# Patient Record
Sex: FEMALE | Race: WHITE | NOT HISPANIC OR LATINO | Employment: FULL TIME | URBAN - METROPOLITAN AREA
[De-identification: names, ages, dates, MRNs, and addresses within clinical notes are randomized per-mention and may not be internally consistent; named-entity substitution may affect disease eponyms.]

---

## 2019-01-25 ENCOUNTER — HOSPITAL ENCOUNTER (OUTPATIENT)
Facility: MEDICAL CENTER | Age: 29
End: 2019-01-25
Attending: OBSTETRICS & GYNECOLOGY
Payer: COMMERCIAL

## 2019-01-25 ENCOUNTER — HOSPITAL ENCOUNTER (OUTPATIENT)
Dept: LAB | Facility: MEDICAL CENTER | Age: 29
End: 2019-01-25
Attending: OBSTETRICS & GYNECOLOGY
Payer: COMMERCIAL

## 2019-01-25 LAB
APPEARANCE UR: CLEAR
COLOR UR: YELLOW
GLUCOSE UR STRIP.AUTO-MCNC: NEGATIVE MG/DL
KETONES UR STRIP.AUTO-MCNC: NEGATIVE MG/DL
LEUKOCYTE ESTERASE UR QL STRIP.AUTO: NEGATIVE
MICRO URNS: NORMAL
NITRITE UR QL STRIP.AUTO: NEGATIVE
PH UR STRIP.AUTO: 6 [PH]
PROT UR QL STRIP: NEGATIVE MG/DL
RBC UR QL AUTO: NEGATIVE
SP GR UR STRIP.AUTO: 1.03

## 2019-01-25 PROCEDURE — 81003 URINALYSIS AUTO W/O SCOPE: CPT

## 2019-01-25 PROCEDURE — 87086 URINE CULTURE/COLONY COUNT: CPT

## 2019-01-26 ENCOUNTER — HOSPITAL ENCOUNTER (OUTPATIENT)
Dept: LAB | Facility: MEDICAL CENTER | Age: 29
End: 2019-01-26
Attending: OBSTETRICS & GYNECOLOGY
Payer: COMMERCIAL

## 2019-01-26 LAB
ANION GAP SERPL CALC-SCNC: 9 MMOL/L (ref 0–11.9)
B-HCG SERPL-ACNC: <0.6 MIU/ML (ref 0–5)
BASOPHILS # BLD AUTO: 0.4 % (ref 0–1.8)
BASOPHILS # BLD: 0.04 K/UL (ref 0–0.12)
CHLORIDE SERPL-SCNC: 109 MMOL/L (ref 96–112)
CO2 SERPL-SCNC: 23 MMOL/L (ref 20–33)
EOSINOPHIL # BLD AUTO: 0.4 K/UL (ref 0–0.51)
EOSINOPHIL NFR BLD: 4.1 % (ref 0–6.9)
ERYTHROCYTE [DISTWIDTH] IN BLOOD BY AUTOMATED COUNT: 42.4 FL (ref 35.9–50)
HCT VFR BLD AUTO: 42.3 % (ref 37–47)
HGB BLD-MCNC: 13.3 G/DL (ref 12–16)
IMM GRANULOCYTES # BLD AUTO: 0.03 K/UL (ref 0–0.11)
IMM GRANULOCYTES NFR BLD AUTO: 0.3 % (ref 0–0.9)
LYMPHOCYTES # BLD AUTO: 2.37 K/UL (ref 1–4.8)
LYMPHOCYTES NFR BLD: 24.1 % (ref 22–41)
MCH RBC QN AUTO: 25.6 PG (ref 27–33)
MCHC RBC AUTO-ENTMCNC: 31.4 G/DL (ref 33.6–35)
MCV RBC AUTO: 81.3 FL (ref 81.4–97.8)
MONOCYTES # BLD AUTO: 0.52 K/UL (ref 0–0.85)
MONOCYTES NFR BLD AUTO: 5.3 % (ref 0–13.4)
NEUTROPHILS # BLD AUTO: 6.46 K/UL (ref 2–7.15)
NEUTROPHILS NFR BLD: 65.8 % (ref 44–72)
NRBC # BLD AUTO: 0 K/UL
NRBC BLD-RTO: 0 /100 WBC
PLATELET # BLD AUTO: 255 K/UL (ref 164–446)
PMV BLD AUTO: 12.6 FL (ref 9–12.9)
POTASSIUM SERPL-SCNC: 3.5 MMOL/L (ref 3.6–5.5)
RBC # BLD AUTO: 5.2 M/UL (ref 4.2–5.4)
SODIUM SERPL-SCNC: 141 MMOL/L (ref 135–145)
WBC # BLD AUTO: 9.8 K/UL (ref 4.8–10.8)

## 2019-01-26 PROCEDURE — 85025 COMPLETE CBC W/AUTO DIFF WBC: CPT

## 2019-01-26 PROCEDURE — 36415 COLL VENOUS BLD VENIPUNCTURE: CPT

## 2019-01-26 PROCEDURE — 80051 ELECTROLYTE PANEL: CPT

## 2019-01-26 PROCEDURE — 84702 CHORIONIC GONADOTROPIN TEST: CPT

## 2019-01-28 LAB
BACTERIA UR CULT: NORMAL
SIGNIFICANT IND 70042: NORMAL
SITE SITE: NORMAL
SOURCE SOURCE: NORMAL

## 2019-04-04 ENCOUNTER — HOSPITAL ENCOUNTER (OUTPATIENT)
Dept: LAB | Facility: MEDICAL CENTER | Age: 29
End: 2019-04-04
Attending: OBSTETRICS & GYNECOLOGY
Payer: COMMERCIAL

## 2019-04-04 PROCEDURE — 87077 CULTURE AEROBIC IDENTIFY: CPT

## 2019-04-04 PROCEDURE — 87070 CULTURE OTHR SPECIMN AEROBIC: CPT

## 2019-04-04 PROCEDURE — 87186 SC STD MICRODIL/AGAR DIL: CPT

## 2019-04-07 LAB
BACTERIA WND AEROBE CULT: ABNORMAL
BACTERIA WND AEROBE CULT: ABNORMAL
SIGNIFICANT IND 70042: ABNORMAL
SITE SITE: ABNORMAL
SOURCE SOURCE: ABNORMAL

## 2019-07-31 ENCOUNTER — HOSPITAL ENCOUNTER (OUTPATIENT)
Dept: RADIOLOGY | Facility: MEDICAL CENTER | Age: 29
End: 2019-07-31
Attending: OBSTETRICS & GYNECOLOGY
Payer: COMMERCIAL

## 2019-07-31 DIAGNOSIS — T83.32XA MALPOSITIONED INTRAUTERINE DEVICE (IUD), INITIAL ENCOUNTER: ICD-10-CM

## 2019-07-31 PROCEDURE — 76830 TRANSVAGINAL US NON-OB: CPT

## 2019-11-07 ENCOUNTER — APPOINTMENT (OUTPATIENT)
Dept: RADIOLOGY | Facility: MEDICAL CENTER | Age: 29
End: 2019-11-07
Attending: EMERGENCY MEDICINE
Payer: COMMERCIAL

## 2019-11-07 ENCOUNTER — HOSPITAL ENCOUNTER (EMERGENCY)
Facility: MEDICAL CENTER | Age: 29
End: 2019-11-07
Attending: EMERGENCY MEDICINE
Payer: COMMERCIAL

## 2019-11-07 VITALS
OXYGEN SATURATION: 95 % | TEMPERATURE: 97.2 F | DIASTOLIC BLOOD PRESSURE: 74 MMHG | SYSTOLIC BLOOD PRESSURE: 136 MMHG | RESPIRATION RATE: 18 BRPM | HEART RATE: 93 BPM

## 2019-11-07 DIAGNOSIS — J40 BRONCHITIS: ICD-10-CM

## 2019-11-07 DIAGNOSIS — R06.2 WHEEZING: ICD-10-CM

## 2019-11-07 PROCEDURE — 94640 AIRWAY INHALATION TREATMENT: CPT

## 2019-11-07 PROCEDURE — 94760 N-INVAS EAR/PLS OXIMETRY 1: CPT

## 2019-11-07 PROCEDURE — 700101 HCHG RX REV CODE 250: Performed by: EMERGENCY MEDICINE

## 2019-11-07 PROCEDURE — 700111 HCHG RX REV CODE 636 W/ 250 OVERRIDE (IP): Performed by: EMERGENCY MEDICINE

## 2019-11-07 PROCEDURE — 71046 X-RAY EXAM CHEST 2 VIEWS: CPT

## 2019-11-07 PROCEDURE — 99284 EMERGENCY DEPT VISIT MOD MDM: CPT

## 2019-11-07 RX ORDER — ALBUTEROL SULFATE 90 UG/1
2 AEROSOL, METERED RESPIRATORY (INHALATION) EVERY 6 HOURS PRN
Qty: 8.5 G | Refills: 0 | Status: SHIPPED | OUTPATIENT
Start: 2019-11-07

## 2019-11-07 RX ORDER — AZITHROMYCIN 250 MG/1
TABLET, FILM COATED ORAL
Qty: 6 TAB | Refills: 0 | Status: ON HOLD | OUTPATIENT
Start: 2019-11-07 | End: 2020-03-03

## 2019-11-07 RX ORDER — TRAZODONE HYDROCHLORIDE 50 MG/1
50 TABLET ORAL NIGHTLY
COMMUNITY

## 2019-11-07 RX ORDER — METHYLPREDNISOLONE 4 MG/1
TABLET ORAL
Qty: 1 PACKAGE | Refills: 0 | Status: ON HOLD | OUTPATIENT
Start: 2019-11-07 | End: 2020-03-03

## 2019-11-07 RX ORDER — IPRATROPIUM BROMIDE AND ALBUTEROL SULFATE 2.5; .5 MG/3ML; MG/3ML
3 SOLUTION RESPIRATORY (INHALATION)
Status: COMPLETED | OUTPATIENT
Start: 2019-11-07 | End: 2019-11-07

## 2019-11-07 RX ORDER — ALPRAZOLAM 0.5 MG/1
0.5 TABLET ORAL 3 TIMES DAILY PRN
Status: SHIPPED | COMMUNITY
Start: 2018-09-25 | End: 2024-02-06

## 2019-11-07 RX ADMIN — IPRATROPIUM BROMIDE AND ALBUTEROL SULFATE 3 ML: .5; 3 SOLUTION RESPIRATORY (INHALATION) at 09:21

## 2019-11-07 RX ADMIN — PREDNISONE 60 MG: 10 TABLET ORAL at 09:19

## 2019-11-07 ASSESSMENT — PAIN SCALES - WONG BAKER: WONGBAKER_NUMERICALRESPONSE: DOESN'T HURT AT ALL

## 2019-11-07 ASSESSMENT — LIFESTYLE VARIABLES: DO YOU DRINK ALCOHOL: NO

## 2019-11-07 NOTE — DISCHARGE INSTRUCTIONS
Take the medication as directed.  Any worsening difficulty breathing, new or different symptoms or concerns return.

## 2019-11-07 NOTE — ED PROVIDER NOTES
"ED Provider Note    CHIEF COMPLAINT  Chief Complaint   Patient presents with   • Congestion   • Cough       HPI  Kelli Doherty is a 29 y.o. female who presents to the emergency department with persistent cough.  Patient has been coughing for over a week.  Patient has a history of asthma in the past but has not used inhalers in several years.  Patient states at times the cough is so bad it actually makes her vomit.  She states she is had brown-green mucus but no blood in her mucus.  She denies any leg swelling.  Patient does complain of a little bit of burning in her chest with cough only.  No exertional chest pain.  No leg swelling or calf pain.  No history of blood clots or cardiac problems.  No diarrhea.  She is had chills but no fevers.  Over-the-counter medications have not helped her symptoms.    REVIEW OF SYSTEMS  Positive for cough and nasal congestion, productive sputum, chills, Negative for chest pain, dizziness, syncope, leg swelling, hyperglycemia, headache, sore throat, diarrhea.    PAST MEDICAL HISTORY   Asthma    SOCIAL HISTORY  Social History     Tobacco Use   • Smoking status: Current Every Day Smoker     Packs/day: 0.50     Types: Cigarettes   Substance and Sexual Activity   • Alcohol use: No   • Drug use: No   • Sexual activity: Not on file       SURGICAL HISTORY   has a past surgical history that includes gyn surgery.    CURRENT MEDICATIONS  Reviewed.  See Encounter Summary.      ALLERGIES  Allergies   Allergen Reactions   • Aleve [Naproxen Sodium] Rash     \"rash\"   • Betadine [Povidone Iodine] Rash     \"rash, hives\"   • Sulfamethoxazole W-Trimethoprim Vomiting     \"vomiting\"       PHYSICAL EXAM  VITAL SIGNS: /92   Pulse 92   Temp 36.2 °C (97.2 °F) (Temporal)   Resp 18   SpO2 97%   Constitutional:  Alert in no apparent distress.  Coughing  HENT: Normocephalic, Bilateral external ears normal.  Nasal congestion  Eyes: Pupils are equal and reactive. Conjunctiva normal, non-icteric. "   Thorax & Lungs: Coarse breath sounds with few scattered wheezes, no rhonchi or rales, no retractions or respiratory distress  Cardiovascular: Regular rate and rhythm  Abdomen:  No gross signs of peritonitis, no pain with movement   Skin: Visualized skin is  Dry, No erythema, No rash.   Extremities:   No edema, No asymmetry, no calf tenderness  Neurologic: Alert, Grossly non-focal.   Psychiatric: Affect and Mood normal      Radiology  DX-CHEST-2 VIEWS   Final Result      No evidence of acute cardiopulmonary process.            COURSE & MEDICAL DECISION MAKING  Nursing notes and vital signs were reviewed. (See chart for details)    The patient presents to the Emergency Department with cough and nasal congestion.  Patient has remote history of asthma.  Denies smoking history.  Occasionally coughs so much she vomits.  Coughing here in the ER does not sound like a staccato cough like pertussis.  Patient claims of productive sputum.  No hemoptysis.  Patient is not hypoxic here in any acute respiratory distress.  She does have a few scattered wheezes on exam.  Plan is to obtain an x-ray to rule out pneumonia versus bronchitis versus URI.  Also try a neb treatment.  Patient does not have a symptoms that suggest cardiac etiology.  Patient also has no leg swelling or calf pain and no history to suggest PE.    Chest x-ray has returned and shows no evidence of pneumonia.  Plan at this time is to treat the patient for a possible asthmatic bronchitis.  She will be treated with an albuterol inhaler, steroids and antibiotics.  Strict return precautions were given.  Patient understands plan.         The patient verbally agreed to the discharge precautions and follow-up plan which is documented in EPIC.    FINAL IMPRESSION  1. Bronchitis     2. Wheezing

## 2019-11-07 NOTE — ED TRIAGE NOTES
Pt amb to triage c/o cough and congestion >1wk; progressively worsening. Pt wearing mask in triage.

## 2019-11-07 NOTE — ED NOTES
Pt alert and oriented, endorses ease of breathing. Patient verbalized understanding of dc instructions and follow up. Patient ambulatory without assistance.

## 2020-03-02 ENCOUNTER — HOSPITAL ENCOUNTER (INPATIENT)
Facility: MEDICAL CENTER | Age: 30
LOS: 2 days | DRG: 392 | End: 2020-03-04
Attending: EMERGENCY MEDICINE | Admitting: HOSPITALIST
Payer: COMMERCIAL

## 2020-03-02 ENCOUNTER — APPOINTMENT (OUTPATIENT)
Dept: RADIOLOGY | Facility: MEDICAL CENTER | Age: 30
DRG: 392 | End: 2020-03-02
Attending: EMERGENCY MEDICINE
Payer: COMMERCIAL

## 2020-03-02 DIAGNOSIS — R10.9 ABDOMINAL PAIN, UNSPECIFIED ABDOMINAL LOCATION: ICD-10-CM

## 2020-03-02 DIAGNOSIS — R11.0 NAUSEA: ICD-10-CM

## 2020-03-02 DIAGNOSIS — K52.9 COLITIS: ICD-10-CM

## 2020-03-02 PROBLEM — J45.909 ASTHMA: Status: ACTIVE | Noted: 2020-03-02

## 2020-03-02 PROBLEM — F41.9 ANXIETY DISORDER: Status: ACTIVE | Noted: 2020-03-02

## 2020-03-02 PROBLEM — E87.6 HYPOKALEMIA: Status: ACTIVE | Noted: 2020-03-02

## 2020-03-02 LAB
ALBUMIN SERPL BCP-MCNC: 4.3 G/DL (ref 3.2–4.9)
ALBUMIN/GLOB SERPL: 1.4 G/DL
ALP SERPL-CCNC: 69 U/L (ref 30–99)
ALT SERPL-CCNC: 13 U/L (ref 2–50)
ANION GAP SERPL CALC-SCNC: 15 MMOL/L (ref 7–16)
APPEARANCE UR: CLEAR
AST SERPL-CCNC: 12 U/L (ref 12–45)
BASOPHILS # BLD AUTO: 0.4 % (ref 0–1.8)
BASOPHILS # BLD: 0.05 K/UL (ref 0–0.12)
BILIRUB SERPL-MCNC: 0.3 MG/DL (ref 0.1–1.5)
BILIRUB UR QL STRIP.AUTO: NEGATIVE
BUN SERPL-MCNC: 9 MG/DL (ref 8–22)
CALCIUM SERPL-MCNC: 9.2 MG/DL (ref 8.4–10.2)
CHLORIDE SERPL-SCNC: 106 MMOL/L (ref 96–112)
CO2 SERPL-SCNC: 24 MMOL/L (ref 20–33)
COLOR UR: YELLOW
CREAT SERPL-MCNC: 0.71 MG/DL (ref 0.5–1.4)
EOSINOPHIL # BLD AUTO: 0.1 K/UL (ref 0–0.51)
EOSINOPHIL NFR BLD: 0.8 % (ref 0–6.9)
ERYTHROCYTE [DISTWIDTH] IN BLOOD BY AUTOMATED COUNT: 40 FL (ref 35.9–50)
GLOBULIN SER CALC-MCNC: 3 G/DL (ref 1.9–3.5)
GLUCOSE SERPL-MCNC: 101 MG/DL (ref 65–99)
GLUCOSE UR STRIP.AUTO-MCNC: NEGATIVE MG/DL
HCG SERPL QL: NEGATIVE
HCT VFR BLD AUTO: 43.9 % (ref 37–47)
HGB BLD-MCNC: 14.1 G/DL (ref 12–16)
IMM GRANULOCYTES # BLD AUTO: 0.09 K/UL (ref 0–0.11)
IMM GRANULOCYTES NFR BLD AUTO: 0.7 % (ref 0–0.9)
KETONES UR STRIP.AUTO-MCNC: NEGATIVE MG/DL
LEUKOCYTE ESTERASE UR QL STRIP.AUTO: NEGATIVE
LIPASE SERPL-CCNC: 13 U/L (ref 7–58)
LYMPHOCYTES # BLD AUTO: 3.91 K/UL (ref 1–4.8)
LYMPHOCYTES NFR BLD: 30.1 % (ref 22–41)
MCH RBC QN AUTO: 25.8 PG (ref 27–33)
MCHC RBC AUTO-ENTMCNC: 32.1 G/DL (ref 33.6–35)
MCV RBC AUTO: 80.4 FL (ref 81.4–97.8)
MICRO URNS: NORMAL
MONOCYTES # BLD AUTO: 0.59 K/UL (ref 0–0.85)
MONOCYTES NFR BLD AUTO: 4.5 % (ref 0–13.4)
NEUTROPHILS # BLD AUTO: 8.26 K/UL (ref 2–7.15)
NEUTROPHILS NFR BLD: 63.5 % (ref 44–72)
NITRITE UR QL STRIP.AUTO: NEGATIVE
NRBC # BLD AUTO: 0 K/UL
NRBC BLD-RTO: 0 /100 WBC
PH UR STRIP.AUTO: 5.5 [PH] (ref 5–8)
PLATELET # BLD AUTO: 309 K/UL (ref 164–446)
PMV BLD AUTO: 11.4 FL (ref 9–12.9)
POTASSIUM SERPL-SCNC: 3 MMOL/L (ref 3.6–5.5)
PROT SERPL-MCNC: 7.3 G/DL (ref 6–8.2)
PROT UR QL STRIP: NEGATIVE MG/DL
RBC # BLD AUTO: 5.46 M/UL (ref 4.2–5.4)
RBC UR QL AUTO: NEGATIVE
SODIUM SERPL-SCNC: 145 MMOL/L (ref 135–145)
SP GR UR REFRACTOMETRY: 1.03
WBC # BLD AUTO: 13 K/UL (ref 4.8–10.8)

## 2020-03-02 PROCEDURE — 96374 THER/PROPH/DIAG INJ IV PUSH: CPT

## 2020-03-02 PROCEDURE — 87493 C DIFF AMPLIFIED PROBE: CPT

## 2020-03-02 PROCEDURE — 84703 CHORIONIC GONADOTROPIN ASSAY: CPT

## 2020-03-02 PROCEDURE — 83630 LACTOFERRIN FECAL (QUAL): CPT

## 2020-03-02 PROCEDURE — 87040 BLOOD CULTURE FOR BACTERIA: CPT | Mod: 91

## 2020-03-02 PROCEDURE — 700117 HCHG RX CONTRAST REV CODE 255: Performed by: EMERGENCY MEDICINE

## 2020-03-02 PROCEDURE — 700101 HCHG RX REV CODE 250: Performed by: EMERGENCY MEDICINE

## 2020-03-02 PROCEDURE — 700105 HCHG RX REV CODE 258: Performed by: HOSPITALIST

## 2020-03-02 PROCEDURE — 83690 ASSAY OF LIPASE: CPT

## 2020-03-02 PROCEDURE — 700111 HCHG RX REV CODE 636 W/ 250 OVERRIDE (IP): Performed by: EMERGENCY MEDICINE

## 2020-03-02 PROCEDURE — 99285 EMERGENCY DEPT VISIT HI MDM: CPT

## 2020-03-02 PROCEDURE — 81003 URINALYSIS AUTO W/O SCOPE: CPT

## 2020-03-02 PROCEDURE — 96375 TX/PRO/DX INJ NEW DRUG ADDON: CPT

## 2020-03-02 PROCEDURE — 99223 1ST HOSP IP/OBS HIGH 75: CPT | Performed by: HOSPITALIST

## 2020-03-02 PROCEDURE — 700102 HCHG RX REV CODE 250 W/ 637 OVERRIDE(OP): Performed by: EMERGENCY MEDICINE

## 2020-03-02 PROCEDURE — 74177 CT ABD & PELVIS W/CONTRAST: CPT

## 2020-03-02 PROCEDURE — 700111 HCHG RX REV CODE 636 W/ 250 OVERRIDE (IP): Performed by: HOSPITALIST

## 2020-03-02 PROCEDURE — 700102 HCHG RX REV CODE 250 W/ 637 OVERRIDE(OP): Performed by: HOSPITALIST

## 2020-03-02 PROCEDURE — 85025 COMPLETE CBC W/AUTO DIFF WBC: CPT

## 2020-03-02 PROCEDURE — 36415 COLL VENOUS BLD VENIPUNCTURE: CPT

## 2020-03-02 PROCEDURE — 770006 HCHG ROOM/CARE - MED/SURG/GYN SEMI*

## 2020-03-02 PROCEDURE — A9270 NON-COVERED ITEM OR SERVICE: HCPCS | Performed by: HOSPITALIST

## 2020-03-02 PROCEDURE — 80053 COMPREHEN METABOLIC PANEL: CPT

## 2020-03-02 PROCEDURE — 96376 TX/PRO/DX INJ SAME DRUG ADON: CPT

## 2020-03-02 PROCEDURE — A9270 NON-COVERED ITEM OR SERVICE: HCPCS | Performed by: EMERGENCY MEDICINE

## 2020-03-02 RX ORDER — MORPHINE SULFATE 4 MG/ML
4 INJECTION, SOLUTION INTRAMUSCULAR; INTRAVENOUS ONCE
Status: COMPLETED | OUTPATIENT
Start: 2020-03-02 | End: 2020-03-02

## 2020-03-02 RX ORDER — ALBUTEROL SULFATE 90 UG/1
2 AEROSOL, METERED RESPIRATORY (INHALATION) EVERY 6 HOURS PRN
Status: DISCONTINUED | OUTPATIENT
Start: 2020-03-02 | End: 2020-03-04 | Stop reason: HOSPADM

## 2020-03-02 RX ORDER — OXYCODONE HYDROCHLORIDE 5 MG/1
2.5 TABLET ORAL
Status: DISCONTINUED | OUTPATIENT
Start: 2020-03-02 | End: 2020-03-04 | Stop reason: HOSPADM

## 2020-03-02 RX ORDER — SUCRALFATE ORAL 1 G/10ML
1 SUSPENSION ORAL EVERY 6 HOURS
Status: DISCONTINUED | OUTPATIENT
Start: 2020-03-03 | End: 2020-03-04 | Stop reason: HOSPADM

## 2020-03-02 RX ORDER — PROMETHAZINE HYDROCHLORIDE 25 MG/1
12.5-25 SUPPOSITORY RECTAL EVERY 4 HOURS PRN
Status: DISCONTINUED | OUTPATIENT
Start: 2020-03-02 | End: 2020-03-04 | Stop reason: HOSPADM

## 2020-03-02 RX ORDER — MORPHINE SULFATE 4 MG/ML
4 INJECTION, SOLUTION INTRAMUSCULAR; INTRAVENOUS ONCE
Status: ACTIVE | OUTPATIENT
Start: 2020-03-02 | End: 2020-03-03

## 2020-03-02 RX ORDER — PROCHLORPERAZINE EDISYLATE 5 MG/ML
5-10 INJECTION INTRAMUSCULAR; INTRAVENOUS EVERY 4 HOURS PRN
Status: DISCONTINUED | OUTPATIENT
Start: 2020-03-02 | End: 2020-03-04 | Stop reason: HOSPADM

## 2020-03-02 RX ORDER — ALPRAZOLAM 0.5 MG/1
0.5 TABLET ORAL NIGHTLY PRN
Status: DISCONTINUED | OUTPATIENT
Start: 2020-03-02 | End: 2020-03-04 | Stop reason: HOSPADM

## 2020-03-02 RX ORDER — ONDANSETRON 2 MG/ML
4 INJECTION INTRAMUSCULAR; INTRAVENOUS ONCE
Status: COMPLETED | OUTPATIENT
Start: 2020-03-02 | End: 2020-03-02

## 2020-03-02 RX ORDER — HYDROMORPHONE HYDROCHLORIDE 1 MG/ML
0.5 INJECTION, SOLUTION INTRAMUSCULAR; INTRAVENOUS; SUBCUTANEOUS ONCE
Status: ACTIVE | OUTPATIENT
Start: 2020-03-02 | End: 2020-03-03

## 2020-03-02 RX ORDER — OXYCODONE HYDROCHLORIDE 5 MG/1
5 TABLET ORAL
Status: DISCONTINUED | OUTPATIENT
Start: 2020-03-02 | End: 2020-03-04 | Stop reason: HOSPADM

## 2020-03-02 RX ORDER — ONDANSETRON 4 MG/1
4 TABLET, ORALLY DISINTEGRATING ORAL EVERY 4 HOURS PRN
Status: DISCONTINUED | OUTPATIENT
Start: 2020-03-02 | End: 2020-03-04 | Stop reason: HOSPADM

## 2020-03-02 RX ORDER — TRAZODONE HYDROCHLORIDE 50 MG/1
50 TABLET ORAL NIGHTLY
Status: DISCONTINUED | OUTPATIENT
Start: 2020-03-02 | End: 2020-03-04 | Stop reason: HOSPADM

## 2020-03-02 RX ORDER — PROMETHAZINE HYDROCHLORIDE 25 MG/1
12.5-25 TABLET ORAL EVERY 4 HOURS PRN
Status: DISCONTINUED | OUTPATIENT
Start: 2020-03-02 | End: 2020-03-04 | Stop reason: HOSPADM

## 2020-03-02 RX ORDER — ONDANSETRON 2 MG/ML
4 INJECTION INTRAMUSCULAR; INTRAVENOUS EVERY 4 HOURS PRN
Status: DISCONTINUED | OUTPATIENT
Start: 2020-03-02 | End: 2020-03-04 | Stop reason: HOSPADM

## 2020-03-02 RX ORDER — POTASSIUM CHLORIDE 20 MEQ/1
40 TABLET, EXTENDED RELEASE ORAL ONCE
Status: COMPLETED | OUTPATIENT
Start: 2020-03-02 | End: 2020-03-02

## 2020-03-02 RX ORDER — SODIUM CHLORIDE, SODIUM LACTATE, POTASSIUM CHLORIDE, CALCIUM CHLORIDE 600; 310; 30; 20 MG/100ML; MG/100ML; MG/100ML; MG/100ML
INJECTION, SOLUTION INTRAVENOUS CONTINUOUS
Status: DISCONTINUED | OUTPATIENT
Start: 2020-03-02 | End: 2020-03-04 | Stop reason: HOSPADM

## 2020-03-02 RX ORDER — CEFDINIR 300 MG/1
300 CAPSULE ORAL EVERY 12 HOURS
Status: DISCONTINUED | OUTPATIENT
Start: 2020-03-02 | End: 2020-03-03

## 2020-03-02 RX ORDER — ACETAMINOPHEN 325 MG/1
650 TABLET ORAL EVERY 6 HOURS PRN
Status: DISCONTINUED | OUTPATIENT
Start: 2020-03-02 | End: 2020-03-04 | Stop reason: HOSPADM

## 2020-03-02 RX ORDER — HYDROMORPHONE HYDROCHLORIDE 1 MG/ML
0.25 INJECTION, SOLUTION INTRAMUSCULAR; INTRAVENOUS; SUBCUTANEOUS
Status: DISCONTINUED | OUTPATIENT
Start: 2020-03-02 | End: 2020-03-04 | Stop reason: HOSPADM

## 2020-03-02 RX ADMIN — CEFDINIR 300 MG: 300 CAPSULE ORAL at 20:48

## 2020-03-02 RX ADMIN — OXYCODONE HYDROCHLORIDE 5 MG: 5 TABLET ORAL at 23:09

## 2020-03-02 RX ADMIN — HYDROMORPHONE HYDROCHLORIDE 0.25 MG: 1 INJECTION, SOLUTION INTRAMUSCULAR; INTRAVENOUS; SUBCUTANEOUS at 23:57

## 2020-03-02 RX ADMIN — PROCHLORPERAZINE EDISYLATE 10 MG: 5 INJECTION INTRAMUSCULAR; INTRAVENOUS at 23:48

## 2020-03-02 RX ADMIN — METRONIDAZOLE 500 MG: 500 INJECTION, SOLUTION INTRAVENOUS at 20:48

## 2020-03-02 RX ADMIN — SUCRALFATE 1 G: 1 SUSPENSION ORAL at 23:10

## 2020-03-02 RX ADMIN — IOHEXOL 100 ML: 350 INJECTION, SOLUTION INTRAVENOUS at 19:54

## 2020-03-02 RX ADMIN — POTASSIUM CHLORIDE 40 MEQ: 1500 TABLET, EXTENDED RELEASE ORAL at 23:09

## 2020-03-02 RX ADMIN — FENTANYL CITRATE 50 MCG: 50 INJECTION INTRAMUSCULAR; INTRAVENOUS at 19:39

## 2020-03-02 RX ADMIN — MORPHINE SULFATE 4 MG: 4 INJECTION INTRAVENOUS at 20:42

## 2020-03-02 RX ADMIN — TRAZODONE HYDROCHLORIDE 50 MG: 50 TABLET ORAL at 23:09

## 2020-03-02 RX ADMIN — LIDOCAINE HYDROCHLORIDE 30 ML: 20 SOLUTION OROPHARYNGEAL at 18:39

## 2020-03-02 RX ADMIN — SODIUM CHLORIDE, POTASSIUM CHLORIDE, SODIUM LACTATE AND CALCIUM CHLORIDE: 600; 310; 30; 20 INJECTION, SOLUTION INTRAVENOUS at 23:43

## 2020-03-02 RX ADMIN — CEFTRIAXONE SODIUM 2 G: 2 INJECTION, POWDER, FOR SOLUTION INTRAMUSCULAR; INTRAVENOUS at 23:10

## 2020-03-02 RX ADMIN — ONDANSETRON 4 MG: 2 INJECTION INTRAMUSCULAR; INTRAVENOUS at 20:42

## 2020-03-02 RX ADMIN — ONDANSETRON 4 MG: 2 INJECTION INTRAMUSCULAR; INTRAVENOUS at 18:43

## 2020-03-02 ASSESSMENT — LIFESTYLE VARIABLES
EVER HAD A DRINK FIRST THING IN THE MORNING TO STEADY YOUR NERVES TO GET RID OF A HANGOVER: NO
HAVE PEOPLE ANNOYED YOU BY CRITICIZING YOUR DRINKING: NO
CONSUMPTION TOTAL: NEGATIVE
TOTAL SCORE: 0
ON A TYPICAL DAY WHEN YOU DRINK ALCOHOL HOW MANY DRINKS DO YOU HAVE: 1
EVER FELT BAD OR GUILTY ABOUT YOUR DRINKING: NO
ALCOHOL_USE: NO
AVERAGE NUMBER OF DAYS PER WEEK YOU HAVE A DRINK CONTAINING ALCOHOL: 2
TOTAL SCORE: 0
EVER_SMOKED: YES
HAVE YOU EVER FELT YOU SHOULD CUT DOWN ON YOUR DRINKING: NO
HOW MANY TIMES IN THE PAST YEAR HAVE YOU HAD 5 OR MORE DRINKS IN A DAY: 0
TOTAL SCORE: 0

## 2020-03-02 ASSESSMENT — ENCOUNTER SYMPTOMS
SENSORY CHANGE: 0
CONSTIPATION: 0
HEADACHES: 0
TREMORS: 0
NERVOUS/ANXIOUS: 0
DOUBLE VISION: 0
WEAKNESS: 0
DEPRESSION: 0
HEARTBURN: 0
MEMORY LOSS: 0
DIZZINESS: 0
NECK PAIN: 0
SHORTNESS OF BREATH: 0
CHILLS: 0
SPUTUM PRODUCTION: 0
SPEECH CHANGE: 0
EYE PAIN: 0
MYALGIAS: 0
PALPITATIONS: 0
CLAUDICATION: 0
PND: 0
ABDOMINAL PAIN: 1
STRIDOR: 0
BACK PAIN: 0
VOMITING: 0
FEVER: 0
BLOOD IN STOOL: 0
COUGH: 0
DIARRHEA: 0
PHOTOPHOBIA: 0
TINGLING: 0
HEMOPTYSIS: 0
ORTHOPNEA: 0
NAUSEA: 0
SORE THROAT: 0
VOMITING: 1
NAUSEA: 1
BLURRED VISION: 0

## 2020-03-02 ASSESSMENT — COGNITIVE AND FUNCTIONAL STATUS - GENERAL
DAILY ACTIVITIY SCORE: 24
MOBILITY SCORE: 24
SUGGESTED CMS G CODE MODIFIER DAILY ACTIVITY: CH
SUGGESTED CMS G CODE MODIFIER MOBILITY: CH

## 2020-03-02 ASSESSMENT — FIBROSIS 4 INDEX: FIB4 SCORE: 0.33

## 2020-03-02 ASSESSMENT — PATIENT HEALTH QUESTIONNAIRE - PHQ9
1. LITTLE INTEREST OR PLEASURE IN DOING THINGS: NOT AT ALL
2. FEELING DOWN, DEPRESSED, IRRITABLE, OR HOPELESS: NOT AT ALL
SUM OF ALL RESPONSES TO PHQ9 QUESTIONS 1 AND 2: 0

## 2020-03-03 LAB
ALBUMIN SERPL BCP-MCNC: 3.4 G/DL (ref 3.2–4.9)
ALBUMIN/GLOB SERPL: 1.4 G/DL
ALP SERPL-CCNC: 81 U/L (ref 30–99)
ALT SERPL-CCNC: 70 U/L (ref 2–50)
ANION GAP SERPL CALC-SCNC: 11 MMOL/L (ref 7–16)
AST SERPL-CCNC: 125 U/L (ref 12–45)
BASOPHILS # BLD AUTO: 0.2 % (ref 0–1.8)
BASOPHILS # BLD: 0.02 K/UL (ref 0–0.12)
BILIRUB SERPL-MCNC: 0.4 MG/DL (ref 0.1–1.5)
BUN SERPL-MCNC: 8 MG/DL (ref 8–22)
C DIFF DNA SPEC QL NAA+PROBE: NEGATIVE
C DIFF TOX GENS STL QL NAA+PROBE: NEGATIVE
CALCIUM SERPL-MCNC: 8 MG/DL (ref 8.4–10.2)
CHLORIDE SERPL-SCNC: 105 MMOL/L (ref 96–112)
CO2 SERPL-SCNC: 24 MMOL/L (ref 20–33)
CREAT SERPL-MCNC: 0.57 MG/DL (ref 0.5–1.4)
EOSINOPHIL # BLD AUTO: 0.06 K/UL (ref 0–0.51)
EOSINOPHIL NFR BLD: 0.6 % (ref 0–6.9)
ERYTHROCYTE [DISTWIDTH] IN BLOOD BY AUTOMATED COUNT: 41.1 FL (ref 35.9–50)
G LAMBLIA+C PARVUM AG STL QL RAPID: NORMAL
GLOBULIN SER CALC-MCNC: 2.4 G/DL (ref 1.9–3.5)
GLUCOSE SERPL-MCNC: 105 MG/DL (ref 65–99)
HCT VFR BLD AUTO: 37.8 % (ref 37–47)
HGB BLD-MCNC: 12 G/DL (ref 12–16)
IMM GRANULOCYTES # BLD AUTO: 0.05 K/UL (ref 0–0.11)
IMM GRANULOCYTES NFR BLD AUTO: 0.5 % (ref 0–0.9)
LYMPHOCYTES # BLD AUTO: 2.68 K/UL (ref 1–4.8)
LYMPHOCYTES NFR BLD: 28.4 % (ref 22–41)
MCH RBC QN AUTO: 25.9 PG (ref 27–33)
MCHC RBC AUTO-ENTMCNC: 31.7 G/DL (ref 33.6–35)
MCV RBC AUTO: 81.5 FL (ref 81.4–97.8)
MONOCYTES # BLD AUTO: 0.6 K/UL (ref 0–0.85)
MONOCYTES NFR BLD AUTO: 6.4 % (ref 0–13.4)
NEUTROPHILS # BLD AUTO: 6.03 K/UL (ref 2–7.15)
NEUTROPHILS NFR BLD: 63.9 % (ref 44–72)
NRBC # BLD AUTO: 0 K/UL
NRBC BLD-RTO: 0 /100 WBC
PLATELET # BLD AUTO: 253 K/UL (ref 164–446)
PMV BLD AUTO: 11.5 FL (ref 9–12.9)
POTASSIUM SERPL-SCNC: 3.4 MMOL/L (ref 3.6–5.5)
PROT SERPL-MCNC: 5.8 G/DL (ref 6–8.2)
RBC # BLD AUTO: 4.64 M/UL (ref 4.2–5.4)
SIGNIFICANT IND 70042: NORMAL
SITE SITE: NORMAL
SODIUM SERPL-SCNC: 140 MMOL/L (ref 135–145)
SOURCE SOURCE: NORMAL
WBC # BLD AUTO: 9.4 K/UL (ref 4.8–10.8)

## 2020-03-03 PROCEDURE — 700102 HCHG RX REV CODE 250 W/ 637 OVERRIDE(OP): Performed by: HOSPITALIST

## 2020-03-03 PROCEDURE — 700102 HCHG RX REV CODE 250 W/ 637 OVERRIDE(OP): Performed by: INTERNAL MEDICINE

## 2020-03-03 PROCEDURE — 700105 HCHG RX REV CODE 258: Performed by: HOSPITALIST

## 2020-03-03 PROCEDURE — 770006 HCHG ROOM/CARE - MED/SURG/GYN SEMI*

## 2020-03-03 PROCEDURE — 85025 COMPLETE CBC W/AUTO DIFF WBC: CPT

## 2020-03-03 PROCEDURE — 87328 CRYPTOSPORIDIUM AG IA: CPT

## 2020-03-03 PROCEDURE — 99233 SBSQ HOSP IP/OBS HIGH 50: CPT | Performed by: INTERNAL MEDICINE

## 2020-03-03 PROCEDURE — 700101 HCHG RX REV CODE 250: Performed by: HOSPITALIST

## 2020-03-03 PROCEDURE — 80053 COMPREHEN METABOLIC PANEL: CPT

## 2020-03-03 PROCEDURE — A9270 NON-COVERED ITEM OR SERVICE: HCPCS | Performed by: HOSPITALIST

## 2020-03-03 PROCEDURE — A9270 NON-COVERED ITEM OR SERVICE: HCPCS | Performed by: INTERNAL MEDICINE

## 2020-03-03 PROCEDURE — 83993 ASSAY FOR CALPROTECTIN FECAL: CPT

## 2020-03-03 PROCEDURE — 700111 HCHG RX REV CODE 636 W/ 250 OVERRIDE (IP): Performed by: HOSPITALIST

## 2020-03-03 RX ORDER — METHYLPREDNISOLONE 4 MG/1
4 TABLET ORAL DAILY
Status: ON HOLD | COMMUNITY
Start: 2020-02-26 | End: 2020-03-04

## 2020-03-03 RX ORDER — METRONIDAZOLE 500 MG/1
500 TABLET ORAL EVERY 8 HOURS
Status: DISCONTINUED | OUTPATIENT
Start: 2020-03-03 | End: 2020-03-04 | Stop reason: HOSPADM

## 2020-03-03 RX ORDER — BUDESONIDE AND FORMOTEROL FUMARATE DIHYDRATE 160; 4.5 UG/1; UG/1
2 AEROSOL RESPIRATORY (INHALATION) 2 TIMES DAILY
COMMUNITY
End: 2020-06-01

## 2020-03-03 RX ORDER — POTASSIUM CHLORIDE 20 MEQ/1
40 TABLET, EXTENDED RELEASE ORAL ONCE
Status: COMPLETED | OUTPATIENT
Start: 2020-03-03 | End: 2020-03-03

## 2020-03-03 RX ADMIN — PROCHLORPERAZINE EDISYLATE 10 MG: 5 INJECTION INTRAMUSCULAR; INTRAVENOUS at 12:51

## 2020-03-03 RX ADMIN — SUCRALFATE 1 G: 1 SUSPENSION ORAL at 12:50

## 2020-03-03 RX ADMIN — TRAZODONE HYDROCHLORIDE 50 MG: 50 TABLET ORAL at 21:17

## 2020-03-03 RX ADMIN — OXYCODONE HYDROCHLORIDE 5 MG: 5 TABLET ORAL at 12:49

## 2020-03-03 RX ADMIN — ACETAMINOPHEN 650 MG: 325 TABLET, FILM COATED ORAL at 09:07

## 2020-03-03 RX ADMIN — POTASSIUM CHLORIDE 40 MEQ: 1500 TABLET, EXTENDED RELEASE ORAL at 12:50

## 2020-03-03 RX ADMIN — ALPRAZOLAM 0.5 MG: 0.5 TABLET ORAL at 19:33

## 2020-03-03 RX ADMIN — SUCRALFATE 1 G: 1 SUSPENSION ORAL at 23:07

## 2020-03-03 RX ADMIN — METRONIDAZOLE 500 MG: 500 TABLET ORAL at 21:17

## 2020-03-03 RX ADMIN — SODIUM CHLORIDE, POTASSIUM CHLORIDE, SODIUM LACTATE AND CALCIUM CHLORIDE: 600; 310; 30; 20 INJECTION, SOLUTION INTRAVENOUS at 09:08

## 2020-03-03 RX ADMIN — OXYCODONE HYDROCHLORIDE 5 MG: 5 TABLET ORAL at 05:44

## 2020-03-03 RX ADMIN — PROCHLORPERAZINE EDISYLATE 10 MG: 5 INJECTION INTRAMUSCULAR; INTRAVENOUS at 17:54

## 2020-03-03 RX ADMIN — CEFTRIAXONE SODIUM 2 G: 2 INJECTION, POWDER, FOR SOLUTION INTRAMUSCULAR; INTRAVENOUS at 23:13

## 2020-03-03 RX ADMIN — METRONIDAZOLE 500 MG: 500 INJECTION, SOLUTION INTRAVENOUS at 05:39

## 2020-03-03 RX ADMIN — METRONIDAZOLE 500 MG: 500 TABLET ORAL at 12:50

## 2020-03-03 RX ADMIN — SODIUM CHLORIDE, POTASSIUM CHLORIDE, SODIUM LACTATE AND CALCIUM CHLORIDE: 600; 310; 30; 20 INJECTION, SOLUTION INTRAVENOUS at 16:54

## 2020-03-03 RX ADMIN — ONDANSETRON 4 MG: 2 INJECTION INTRAMUSCULAR; INTRAVENOUS at 09:13

## 2020-03-03 RX ADMIN — SUCRALFATE 1 G: 1 SUSPENSION ORAL at 17:54

## 2020-03-03 RX ADMIN — ACETAMINOPHEN 650 MG: 325 TABLET, FILM COATED ORAL at 21:17

## 2020-03-03 RX ADMIN — SUCRALFATE 1 G: 1 SUSPENSION ORAL at 05:34

## 2020-03-03 ASSESSMENT — ENCOUNTER SYMPTOMS
DEPRESSION: 0
WEAKNESS: 0
SPUTUM PRODUCTION: 0
WHEEZING: 0
BLURRED VISION: 0
FALLS: 0
PALPITATIONS: 0
HEARTBURN: 0
NECK PAIN: 0
CHILLS: 0
NAUSEA: 1
HEADACHES: 0
SPEECH CHANGE: 0
WEIGHT LOSS: 0
CONSTIPATION: 0
SHORTNESS OF BREATH: 0
BACK PAIN: 0
ABDOMINAL PAIN: 1
FEVER: 0
DIZZINESS: 0
COUGH: 0
SEIZURES: 0
PND: 0
VOMITING: 0
DIARRHEA: 0
EYE PAIN: 0
TREMORS: 0

## 2020-03-03 ASSESSMENT — FIBROSIS 4 INDEX: FIB4 SCORE: 0.41

## 2020-03-03 ASSESSMENT — LIFESTYLE VARIABLES: SUBSTANCE_ABUSE: 0

## 2020-03-03 NOTE — PROGRESS NOTES
Med rec updated and complete  Allergies reviewed  Pt reports no vitamins or OTC's.  Pt reports no antibiotics in the last 2 weeks

## 2020-03-03 NOTE — PROGRESS NOTES
Pt reports inability to tolerate clears, has a HA, and feeling very nauseated.  Medicated per MAR.

## 2020-03-03 NOTE — PROGRESS NOTES
Received telephone report from SOPIHA Lacey. Plan of care discussed. Waiting for patient to arrive to room 303-1.

## 2020-03-03 NOTE — PROGRESS NOTES
2 RN Skin Check    2 RN skin check complete.   Devices in place: N/A.  Skin assessed under devices: N\A.  Confirmed pressure ulcers found on: N/A.  New potential pressure ulcers noted on N/A. Wound consult placed N/A.  The following interventions in place remind patient to make frequent positional changes and turns.

## 2020-03-03 NOTE — ASSESSMENT & PLAN NOTE
Potassium supplementation ordered  Expect increase of 0.1 mEq/L per each 10 mEq given  Will recheck after supplementation  Lab Results   Component Value Date/Time    POTASSIUM 3.4 (L) 03/03/2020 02:37 AM    Recheck bmp in the am for changes    Continue oral supplement

## 2020-03-03 NOTE — DIETARY
NUTRITION SERVICES: BMI - Pt with BMI >40 (=Body mass index is 47.42 kg/m².), morbid obesity. Weight loss counseling not appropriate in acute care setting. RECOMMEND - Referral to outpatient nutrition services for weight management after D/C.

## 2020-03-03 NOTE — H&P
Hospital Medicine History & Physical Note    Date of Service  3/2/2020    Primary Care Physician  Satish Stephenson M.D.    Consultants  None    Code Status  Full Code    Chief Complaint  Chief Complaint   Patient presents with   • Abdominal Pain     abdominal pain for a few weeks but getting worse  no vomiting or diarrhea       History of Presenting Illness   is a very pleasant 29 y.o. female with a past medical history of anxiety disorder, asthma presented to the emergency room on 3/2/2020 for evaluation of abdominal pain.  Patient says she has had these abdominal pains over the past several months which have been intermittent, described as sharp to dull epigastric area and usually radiates across her abdomen.  However the reason why she came in today to the emergency room was because over the past 1 day she has had persistent pain that has not gone away which normally does.  She reports the pain as 8-10 out of 10 in severity, cramping today a little bit different than her usual discomfort.  She is also had some mild nausea but no vomiting.  Denies any diarrhea, denies any dysuria, denies any fevers chills.  Denies any melena or hematochezia.    I discussed the presenting symptoms, physical examination, lab and radiological study results with the emergency department physician.      Review of Systems  Review of Systems   Constitutional: Positive for malaise/fatigue. Negative for chills and fever.   HENT: Negative for congestion, hearing loss, sore throat and tinnitus.    Eyes: Negative for blurred vision, double vision, photophobia and pain.   Respiratory: Negative for cough, hemoptysis, sputum production, shortness of breath and stridor.    Cardiovascular: Negative for chest pain, palpitations, orthopnea, claudication and PND.   Gastrointestinal: Positive for abdominal pain and vomiting. Negative for blood in stool, constipation, heartburn, melena and nausea.   Genitourinary: Negative for dysuria,  "frequency and urgency.   Musculoskeletal: Negative for back pain, myalgias and neck pain.   Neurological: Negative for dizziness, tingling, tremors, sensory change, speech change, weakness and headaches.   Psychiatric/Behavioral: Negative for depression, memory loss and suicidal ideas. The patient is not nervous/anxious.    All other systems reviewed and are negative.      Past Medical History  Anxiety  Asthma    Surgical History   has a past surgical history that includes gyn surgery.    Family History  Denies family history of inflammatory bowel disorders    Social History   reports that she quit smoking about 5 years ago. Her smoking use included cigarettes. She smoked 0.50 packs per day. She does not have any smokeless tobacco history on file. She reports that she does not drink alcohol or use drugs.    Allergies  Allergies   Allergen Reactions   • Aleve [Naproxen Sodium] Rash     \"rash\"   • Betadine [Povidone Iodine] Rash     \"rash, hives\"   • Sulfamethoxazole W-Trimethoprim Vomiting     \"vomiting\"       Medications  Prior to Admission medications    Medication Sig Start Date End Date Taking? Authorizing Provider   ALPRAZolam (XANAX) 0.5 MG Tab Take 0.5 mg by mouth at bedtime as needed. 9/25/18   Physician Outpatient   traZODone (DESYREL) 50 MG Tab Take 50 mg by mouth every evening.    Physician Outpatient   Elagolix Sodium (ORILISSA) 200 MG Tab Take 1 Tab by mouth 2 Times a Day.    Physician Outpatient   albuterol 108 (90 Base) MCG/ACT Aero Soln inhalation aerosol Inhale 2 Puffs by mouth every 6 hours as needed for Shortness of Breath. 11/7/19   Loly Santizo M.D.   azithromycin (ZITHROMAX) 250 MG Tab Take two tabs by mouth on day one, then one tab by mouth daily on days 2-5. 11/7/19   Loly Santizo M.D.   methylPREDNISolone (MEDROL DOSEPAK) 4 MG Tablet Therapy Pack Use as directed 11/7/19   Loly Santizo M.D.       Physical Exam  Temp:  [36.3 °C (97.4 °F)] 36.3 °C (97.4 °F)  Pulse:  [71-75] 71  Resp:  " [18-20] 18  BP: (144)/(90) 144/90  SpO2:  [95 %-96 %] 96 %  Physical Exam   Constitutional: She is oriented to person, place, and time. She appears well-developed and well-nourished. She appears distressed (Due to abdominal pain).   HENT:   Head: Normocephalic and atraumatic.   Mouth/Throat: No oropharyngeal exudate.   Mucous membranes dry   Eyes: Pupils are equal, round, and reactive to light. Conjunctivae are normal. Right eye exhibits no discharge. No scleral icterus.   Neck: Neck supple. No JVD present. No thyromegaly present.   Cardiovascular: Intact distal pulses.   No murmur heard.  Pulmonary/Chest: Effort normal and breath sounds normal. No stridor. No respiratory distress. She has no wheezes. She has no rales.   Abdominal: Soft. Bowel sounds are normal. She exhibits no distension and no mass. There is abdominal tenderness (Epigastric area on deep palpation). There is no rebound and no guarding.   Musculoskeletal: Normal range of motion.         General: No edema.   Neurological: She is alert and oriented to person, place, and time. No cranial nerve deficit.   Skin: Skin is warm. She is not diaphoretic. No erythema.   Psychiatric: She has a normal mood and affect. Her behavior is normal. Thought content normal.   Nursing note and vitals reviewed.      Laboratory:  Recent Labs     03/02/20  1757   WBC 13.0*   RBC 5.46*   HEMOGLOBIN 14.1   HEMATOCRIT 43.9   MCV 80.4*   MCH 25.8*   MCHC 32.1*   RDW 40.0   PLATELETCT 309   MPV 11.4     Recent Labs     03/02/20  1757   SODIUM 145   POTASSIUM 3.0*   CHLORIDE 106   CO2 24   GLUCOSE 101*   BUN 9   CREATININE 0.71   CALCIUM 9.2     Recent Labs     03/02/20  1757   ALTSGPT 13   ASTSGOT 12   ALKPHOSPHAT 69   TBILIRUBIN 0.3   LIPASE 13   GLUCOSE 101*               Urinalysis:          Imaging:  CT-ABDOMEN-PELVIS WITH   Final Result      1.  Mild diffusely thickened wall of the colon which may represent presence of infectious colitis and can also be seen with  inflammatory bowel disease.      2.  Normal appendix.      3.  Fatty liver. Number of small low-density focus within the right lobe of the liver inferiorly likely representing hemangioma.          Assessment/Plan:  I anticipate this patient will require at least two midnights for appropriate medical management, necessitating inpatient admission.    * Colitis  Assessment & Plan  Infectious vs inflammatory?  CT A/P: Mild diffusely thickened wall of the colon which may represent presence of infectious colitis and can also be seen with inflammatory bowel disease.  Ordered Stool jacey protectin and lactoferrin to assess for degree and if there is a inflammatory response  Consider diagnostic colonoscopy at some point.  Stool Ova and parasites  CDIFF pending.  IV Ceftriaxone and metronidazole ordered and pending  Pain control  IV fluids         AP (abdominal pain)  Assessment & Plan  2/2 to colitis, but also want to consider possible upper GI. She denies melena or hematochezia.   Want to hold off on PPI in light of colitis which can be infectious  Will start Carafate for now, GI cocktail PRN    Asthma  Assessment & Plan  Controlled  PRN albuterol.    Anxiety disorder  Assessment & Plan  Prn xanax.    Hypokalemia  Assessment & Plan  Potassium supplementation ordered  Expect increase of 0.1 mEq/L per each 10 mEq given  Will recheck after supplementation  Lab Results   Component Value Date/Time    POTASSIUM 3.0 (L) 03/02/2020 05:57 PM    Recheck bmp in the am for changes        VTE prophylaxis: Prophylaxis: SCDs

## 2020-03-03 NOTE — ED TRIAGE NOTES
Mid upper abdominal pain that radiates to back. Pain is intermittent for weeks. Denies any vomiting or diarrhea.

## 2020-03-03 NOTE — ED PROVIDER NOTES
ED Provider Note    Primary care provider: Satish Stephenson M.D.  Means of arrival: private vehicle  History obtained from: patient  History limited by: none    CHIEF COMPLAINT  Chief Complaint   Patient presents with   • Abdominal Pain     abdominal pain for a few weeks but getting worse  no vomiting or diarrhea       HPI  Kelli Doherty is a 29 y.o. female who presents to the Emergency Department for abdominal pain.  Patient reports that for many months she has had abdominal pain that is intermittent in the gastric region and radiating to her entire abdomen.  However over the last day the pain has been persistent and has not gone away like it normally does.  She reports that the pain is in the epigastric periumbilical region radiating to her entire abdomen.  It is cramping and moderate in severity.  Reports associated nausea.She denies any recent travel.  Pain does not seem worse or associated with food.  Denies fevers, chills, dysuria, and changes in bowel movements.    REVIEW OF SYSTEMS  Review of Systems   Constitutional: Negative for chills and fever.   Respiratory: Negative for shortness of breath.    Cardiovascular: Negative for chest pain.   Gastrointestinal: Positive for abdominal pain and nausea. Negative for diarrhea and vomiting.   Genitourinary: Negative for dysuria.   Musculoskeletal: Negative for back pain.   Neurological: Negative for headaches.   All other systems reviewed and are negative.    PAST MEDICAL HISTORY   None  SURGICAL HISTORY   has a past surgical history that includes gyn surgery.    SOCIAL HISTORY  Social History     Tobacco Use   • Smoking status: Former Smoker     Packs/day: 0.50     Types: Cigarettes     Last attempt to quit: 3/2/2015     Years since quittin.0   Substance Use Topics   • Alcohol use: No   • Drug use: No      Social History     Substance and Sexual Activity   Drug Use No       FAMILY HISTORY  History reviewed. No pertinent family history.    CURRENT  "MEDICATIONS  Home Medications    None         ALLERGIES  Allergies   Allergen Reactions   • Aleve [Naproxen Sodium] Rash     \"rash\"   • Betadine [Povidone Iodine] Rash     \"rash, hives\"   • Sulfamethoxazole W-Trimethoprim Vomiting     \"vomiting\"       PHYSICAL EXAM  VITAL SIGNS: /90   Pulse 75   Temp 36.3 °C (97.4 °F) (Temporal)   Resp 20   Ht 1.499 m (4' 11\")   Wt 107.2 kg (236 lb 5.3 oz)   LMP 02/21/2020 (Exact Date)   SpO2 95%   Breastfeeding No   BMI 47.73 kg/m²   Vitals reviewed by myself.  Physical Exam   Constitutional: She is oriented to person, place, and time.   Patient appears very uncomfortable   HENT:   Head: Normocephalic and atraumatic.   Neck: Normal range of motion.   Cardiovascular: Normal rate, regular rhythm and normal heart sounds.   Pulmonary/Chest: Effort normal and breath sounds normal. No respiratory distress. She has no wheezes. She has no rales.   Abdominal: She exhibits no distension. There is abdominal tenderness. There is no rebound and no guarding.   Mild tenderness to palpation diffusely, no rebound, no guarding   Musculoskeletal: Normal range of motion.   Neurological: She is alert and oriented to person, place, and time.     DIAGNOSTIC STUDIES /  LABS  Labs Reviewed   CBC WITH DIFFERENTIAL - Abnormal; Notable for the following components:       Result Value    WBC 13.0 (*)     RBC 5.46 (*)     MCV 80.4 (*)     MCH 25.8 (*)     MCHC 32.1 (*)     Neutrophils (Absolute) 8.26 (*)     All other components within normal limits   COMP METABOLIC PANEL - Abnormal; Notable for the following components:    Potassium 3.0 (*)     Glucose 101 (*)     All other components within normal limits   LIPASE   HCG QUAL SERUM   URINALYSIS,CULTURE IF INDICATED   ESTIMATED GFR   REFRACTOMETER SG   BLOOD CULTURE   BLOOD CULTURE       All labs reviewed by me.    RADIOLOGY  CT-ABDOMEN-PELVIS WITH   Final Result      1.  Mild diffusely thickened wall of the colon which may represent presence of " infectious colitis and can also be seen with inflammatory bowel disease.      2.  Normal appendix.      3.  Fatty liver. Number of small low-density focus within the right lobe of the liver inferiorly likely representing hemangioma.        The radiologist's interpretation of all radiological studies have been reviewed by me.        COURSE & MEDICAL DECISION MAKING  Nursing notes, VS, PMSFHx reviewed in chart.    Patient is a 29 year old female who comes in for evaluation of abdominal pain.  Differential diagnosis includes gastritis, diverticulitis, colitis, cholecystitis, appendicitis, pancreatitis.  Diagnostic work-up includes labs and CT of the abdomen.    Patient's initial vitals are within normal limits, she appears very uncomfortable on exam.  She is treated with GI cocktail, Zofran and fentanyl with minimal relief of her symptoms.  Patient requires multiple doses of morphine to control her pain in the emergency department.  Labs returned are notable for mild leukocytosis of 13.0.  CT scan returns and is notable for colitis which is likely because of patient's symptoms.  I will empirically treat her for infectious colitis with cefdinir and Flagyl.  As patient has required multiple doses of IV medicine to control her pain I will hospitalize her for further pain management in the setting of colitis.  I discussed the case with Dr. Montes De Oca who has accepted patient for hospitalization.  Patient is in guarded condition.      FINAL IMPRESSION  1. Colitis    2. Abdominal pain, unspecified abdominal location    3. Nausea

## 2020-03-03 NOTE — PROGRESS NOTES
[facility] Medicine Daily Progress Note    Date of Service  3/3/2020    Chief Complaint  29 y.o. female admitted 3/2/2020 with abdominal pain    Hospital Course    Past medical history of anxiety presented with complaint of abdominal pain      Interval Problem Update  3/3.  Patient's abdominal pain slightly improved.  Still no appetite.Patient's pain is local 5-6/10, intermittent and does not radiate to other location, sharp and with some tingling. Can be controlled by pain meds.   Patient also does not have good appetite.    Consultants/Specialty  None    Code Status  Full code    Disposition  To be determined    Review of Systems  Review of Systems   Constitutional: Positive for malaise/fatigue. Negative for chills, fever and weight loss.   HENT: Negative for congestion, ear discharge, ear pain, hearing loss and nosebleeds.    Eyes: Negative for blurred vision and pain.   Respiratory: Negative for cough, sputum production, shortness of breath and wheezing.    Cardiovascular: Negative for chest pain, palpitations, leg swelling and PND.   Gastrointestinal: Positive for abdominal pain and nausea. Negative for constipation, diarrhea, heartburn and vomiting.   Genitourinary: Negative for dysuria, frequency and hematuria.   Musculoskeletal: Negative for back pain, falls, joint pain and neck pain.   Skin: Negative for rash.   Neurological: Negative for dizziness, tremors, speech change, seizures, weakness and headaches.   Psychiatric/Behavioral: Negative for depression, substance abuse and suicidal ideas.        Physical Exam  Temp:  [36.3 °C (97.3 °F)-36.6 °C (97.8 °F)] 36.6 °C (97.8 °F)  Pulse:  [71-95] 78  Resp:  [16-20] 18  BP: ()/(49-90) 121/74  SpO2:  [90 %-97 %] 90 %    Physical Exam  Vitals signs and nursing note reviewed.   Constitutional:       General: She is not in acute distress.     Appearance: Normal appearance. She is normal weight.   HENT:      Head: Normocephalic and atraumatic.      Right Ear:  Tympanic membrane, ear canal and external ear normal.      Left Ear: Tympanic membrane, ear canal and external ear normal.      Nose: Nose normal.      Mouth/Throat:      Mouth: Mucous membranes are moist.      Pharynx: Oropharynx is clear.   Eyes:      Extraocular Movements: Extraocular movements intact.      Conjunctiva/sclera: Conjunctivae normal.      Pupils: Pupils are equal, round, and reactive to light.   Neck:      Musculoskeletal: Normal range of motion and neck supple. No neck rigidity.      Vascular: No carotid bruit.   Cardiovascular:      Rate and Rhythm: Normal rate and regular rhythm.      Pulses: Normal pulses.      Heart sounds: Normal heart sounds. No murmur. No friction rub. No gallop.    Pulmonary:      Effort: Pulmonary effort is normal. No respiratory distress.      Breath sounds: Normal breath sounds. No stridor. No wheezing, rhonchi or rales.   Chest:      Chest wall: No tenderness.   Abdominal:      General: Abdomen is flat. Bowel sounds are normal. There is no distension.      Palpations: Abdomen is soft. There is no mass.      Tenderness: There is abdominal tenderness. There is no guarding or rebound.      Hernia: No hernia is present.   Musculoskeletal: Normal range of motion.         General: No swelling.   Lymphadenopathy:      Cervical: No cervical adenopathy.   Skin:     General: Skin is warm.      Capillary Refill: Capillary refill takes more than 3 seconds.      Coloration: Skin is not jaundiced or pale.   Neurological:      General: No focal deficit present.      Mental Status: She is alert and oriented to person, place, and time. Mental status is at baseline.   Psychiatric:         Mood and Affect: Mood normal.         Behavior: Behavior normal.         Fluids  No intake or output data in the 24 hours ending 03/03/20 1439    Laboratory  Recent Labs     03/02/20  1757 03/03/20  0237   WBC 13.0* 9.4   RBC 5.46* 4.64   HEMOGLOBIN 14.1 12.0   HEMATOCRIT 43.9 37.8   MCV 80.4* 81.5    MCH 25.8* 25.9*   MCHC 32.1* 31.7*   RDW 40.0 41.1   PLATELETCT 309 253   MPV 11.4 11.5     Recent Labs     03/02/20  1757 03/03/20  0237   SODIUM 145 140   POTASSIUM 3.0* 3.4*   CHLORIDE 106 105   CO2 24 24   GLUCOSE 101* 105*   BUN 9 8   CREATININE 0.71 0.57   CALCIUM 9.2 8.0*                   Imaging  CT-ABDOMEN-PELVIS WITH   Final Result      1.  Mild diffusely thickened wall of the colon which may represent presence of infectious colitis and can also be seen with inflammatory bowel disease.      2.  Normal appendix.      3.  Fatty liver. Number of small low-density focus within the right lobe of the liver inferiorly likely representing hemangioma.           Assessment/Plan  * Colitis  Assessment & Plan  Infectious very likely.  Patient does not have hematochezia or diarrhea.  I will ask IBD.    CT A/P: Mild diffusely thickened wall of the colon which may represent presence of infectious colitis and can also be seen with inflammatory bowel disease.  Ordered Stool jacey protectin and lactoferrin to assess for degree and if there is a inflammatory response  Consider diagnostic colonoscopy at some point if patient failed to respond to IV antibiotics  Stool Ova and parasites  CDIFF negative.  IV Ceftriaxone and metronidazole ordered and pending  Pain control  IV fluids         AP (abdominal pain)  Assessment & Plan  2/2 to colitis, but also want to consider possible upper GI. She denies melena or hematochezia.   Want to hold off on PPI in light of colitis which can be infectious  Will start Carafate for now, GI cocktail PRN    Asthma  Assessment & Plan  Controlled  PRN albuterol.    Anxiety disorder  Assessment & Plan  Prn xanax.    Hypokalemia  Assessment & Plan  Potassium supplementation ordered  Expect increase of 0.1 mEq/L per each 10 mEq given  Will recheck after supplementation  Lab Results   Component Value Date/Time    POTASSIUM 3.4 (L) 03/03/2020 02:37 AM    Recheck bmp in the am for changes    Continue  oral supplement         VTE prophylaxis: SCD and Lovenox      Current Facility-Administered Medications:   •  metroNIDAZOLE (FLAGYL) tablet 500 mg, 500 mg, Oral, Q8HRS, Mayra Pratt M.D., 500 mg at 03/03/20 1250  •  enoxaparin (LOVENOX) inj 40 mg, 40 mg, Subcutaneous, DAILY, Mayra Pratt M.D.  •  morphine (pf) 4 MG/ML injection 4 mg, 4 mg, Intravenous, Once, Barbara Luis M.D., Stopped at 03/02/20 2130  •  albuterol inhaler 2 Puff, 2 Puff, Inhalation, Q6HRS PRN, Hernesto Rosa M.D.  •  ALPRAZolam (XANAX) tablet 0.5 mg, 0.5 mg, Oral, HS PRN, Hernesto Rosa M.D.  •  traZODone (DESYREL) tablet 50 mg, 50 mg, Oral, Nightly, Hernesto Rosa M.D., 50 mg at 03/02/20 2309  •  lactated ringers infusion, , Intravenous, Continuous, Hernesto Rosa M.D., Last Rate: 125 mL/hr at 03/03/20 0908  •  acetaminophen (TYLENOL) tablet 650 mg, 650 mg, Oral, Q6HRS PRN, Hernesto Rosa M.D., 650 mg at 03/03/20 0907  •  Notify provider if pain remains uncontrolled, , , CONTINUOUS **AND** Use the numeric rating scale (NRS-11) on regular floors and Critical-Care Pain Observation Tool (CPOT) on ICUs/Trauma to assess pain, , , CONTINUOUS **AND** Pulse Ox (Oximetry), , , CONTINUOUS **AND** Pharmacy Consult Request ...Pain Management Review 1 Each, 1 Each, Other, PHARMACY TO DOSE **AND** If patient difficult to arouse and/or has respiratory depression, stop any opiates that are currently infusing and call a Rapid Response., , , CONTINUOUS **AND** oxyCODONE immediate-release (ROXICODONE) tablet 2.5 mg, 2.5 mg, Oral, Q3HRS PRN, Stopped at 03/03/20 1243 **AND** oxyCODONE immediate-release (ROXICODONE) tablet 5 mg, 5 mg, Oral, Q3HRS PRN, 5 mg at 03/03/20 1249 **AND** HYDROmorphone pf (DILAUDID) injection 0.25 mg, 0.25 mg, Intravenous, Q3HRS PRN, Hernesto Rosa M.D., 0.25 mg at 03/02/20 0767  •  ondansetron (ZOFRAN) syringe/vial injection 4 mg, 4 mg, Intravenous, Q4HRS PRN, Hernesto Rosa M.D., 4 mg at 03/03/20 0913  •   ondansetron (ZOFRAN ODT) dispertab 4 mg, 4 mg, Oral, Q4HRS PRN, Hernesto Rosa M.D.  •  promethazine (PHENERGAN) tablet 12.5-25 mg, 12.5-25 mg, Oral, Q4HRS PRN, Hernesto Rosa M.D.  •  promethazine (PHENERGAN) suppository 12.5-25 mg, 12.5-25 mg, Rectal, Q4HRS PRN, Hernesto Rosa M.D.  •  prochlorperazine (COMPAZINE) injection 5-10 mg, 5-10 mg, Intravenous, Q4HRS PRN, Hernesto Rosa M.D., 10 mg at 03/03/20 1251  •  cefTRIAXone (ROCEPHIN) 2 g in  mL IVPB, 2 g, Intravenous, Q24HRS, Hernesto Rosa M.D., Stopped at 03/02/20 2340  •  Special Contact Isolation, , , CONTINUOUS **AND** [COMPLETED] C Diff by PCR rflx Toxin, , , Once **AND** Pharmacy Consult Request, 1 Each, Other, PHARMACY TO DOSE, Hernesto Rosa M.D.  •  HYDROmorphone pf (DILAUDID) injection 0.5 mg, 0.5 mg, Intravenous, Once, Hernesto Rosa M.D.  •  hyoscyamine-maalox plus-lidocaine viscous (GI COCKTAIL) oral susp 15 mL, 15 mL, Oral, Q6HRS PRN, Hernesto Rosa M.D.  •  sucralfate (CARAFATE) 1 GM/10ML suspension 1 g, 1 g, Oral, Q6HRS, Hernesto Rosa M.D., 1 g at 03/03/20 1250

## 2020-03-03 NOTE — ASSESSMENT & PLAN NOTE
Infectious very likely.  Patient does not have hematochezia or diarrhea.  I will ask IBD.    CT A/P: Mild diffusely thickened wall of the colon which may represent presence of infectious colitis and can also be seen with inflammatory bowel disease.  Ordered Stool jacey protectin and lactoferrin to assess for degree and if there is a inflammatory response  Consider diagnostic colonoscopy at some point if patient failed to respond to IV antibiotics  Stool Ova and parasites  CDIFF negative.  IV Ceftriaxone and metronidazole ordered and pending  Pain control  IV fluids

## 2020-03-03 NOTE — CARE PLAN
Problem: Pain Management  Goal: Pain level will decrease to patient's comfort goal  Outcome: PROGRESSING AS EXPECTED     Problem: Fluid Volume:  Goal: Will maintain balanced intake and output  Outcome: PROGRESSING AS EXPECTED     Problem: Communication  Goal: The ability to communicate needs accurately and effectively will improve  Outcome: PROGRESSING AS EXPECTED     Problem: Safety  Goal: Will remain free from injury  Outcome: PROGRESSING AS EXPECTED  Goal: Will remain free from falls  Outcome: PROGRESSING AS EXPECTED     Problem: Infection  Goal: Will remain free from infection  Outcome: PROGRESSING AS EXPECTED     Problem: Venous Thromboembolism (VTW)/Deep Vein Thrombosis (DVT) Prevention:  Goal: Patient will participate in Venous Thrombosis (VTE)/Deep Vein Thrombosis (DVT)Prevention Measures  Outcome: PROGRESSING AS EXPECTED     Problem: Bowel/Gastric:  Goal: Normal bowel function is maintained or improved  Outcome: PROGRESSING AS EXPECTED  Goal: Will not experience complications related to bowel motility  Outcome: PROGRESSING AS EXPECTED     Problem: Knowledge Deficit  Goal: Knowledge of disease process/condition, treatment plan, diagnostic tests, and medications will improve  Outcome: PROGRESSING AS EXPECTED  Goal: Knowledge of the prescribed therapeutic regimen will improve  Outcome: PROGRESSING AS EXPECTED     Problem: Discharge Barriers/Planning  Goal: Patient's continuum of care needs will be met  Outcome: PROGRESSING AS EXPECTED     Problem: Mobility  Goal: Risk for activity intolerance will decrease  Outcome: PROGRESSING AS EXPECTED

## 2020-03-03 NOTE — ED NOTES
Patient to CT via gurney with rad tech. Rad tech directed to move patient slowly and do not allow pt to walk.

## 2020-03-03 NOTE — PROGRESS NOTES
Per , can run cdiff and lactoferrin in obtained stool sample, but would need another sample for complete O&P. Updated pt on POC.

## 2020-03-03 NOTE — ASSESSMENT & PLAN NOTE
2/2 to colitis, but also want to consider possible upper GI. She denies melena or hematochezia.   Want to hold off on PPI in light of colitis which can be infectious  Will start Carafate for now, GI cocktail PRN

## 2020-03-04 ENCOUNTER — PATIENT OUTREACH (OUTPATIENT)
Dept: HEALTH INFORMATION MANAGEMENT | Facility: OTHER | Age: 30
End: 2020-03-04

## 2020-03-04 VITALS
HEIGHT: 59 IN | SYSTOLIC BLOOD PRESSURE: 146 MMHG | TEMPERATURE: 98.1 F | RESPIRATION RATE: 18 BRPM | OXYGEN SATURATION: 96 % | BODY MASS INDEX: 47.33 KG/M2 | HEART RATE: 89 BPM | WEIGHT: 234.79 LBS | DIASTOLIC BLOOD PRESSURE: 83 MMHG

## 2020-03-04 PROBLEM — K52.9 COLITIS: Status: RESOLVED | Noted: 2020-03-02 | Resolved: 2020-03-04

## 2020-03-04 LAB
ALBUMIN SERPL BCP-MCNC: 3.1 G/DL (ref 3.2–4.9)
ALBUMIN/GLOB SERPL: 1.1 G/DL
ALP SERPL-CCNC: 74 U/L (ref 30–99)
ALT SERPL-CCNC: 43 U/L (ref 2–50)
ANION GAP SERPL CALC-SCNC: 7 MMOL/L (ref 7–16)
AST SERPL-CCNC: 27 U/L (ref 12–45)
BASOPHILS # BLD AUTO: 0.5 % (ref 0–1.8)
BASOPHILS # BLD: 0.04 K/UL (ref 0–0.12)
BILIRUB SERPL-MCNC: 0.2 MG/DL (ref 0.1–1.5)
BUN SERPL-MCNC: 5 MG/DL (ref 8–22)
CALCIUM SERPL-MCNC: 8.3 MG/DL (ref 8.4–10.2)
CHLORIDE SERPL-SCNC: 108 MMOL/L (ref 96–112)
CO2 SERPL-SCNC: 26 MMOL/L (ref 20–33)
CREAT SERPL-MCNC: 0.62 MG/DL (ref 0.5–1.4)
EOSINOPHIL # BLD AUTO: 0.18 K/UL (ref 0–0.51)
EOSINOPHIL NFR BLD: 2.3 % (ref 0–6.9)
ERYTHROCYTE [DISTWIDTH] IN BLOOD BY AUTOMATED COUNT: 41.1 FL (ref 35.9–50)
GLOBULIN SER CALC-MCNC: 2.7 G/DL (ref 1.9–3.5)
GLUCOSE SERPL-MCNC: 90 MG/DL (ref 65–99)
HCT VFR BLD AUTO: 38.4 % (ref 37–47)
HGB BLD-MCNC: 12 G/DL (ref 12–16)
IMM GRANULOCYTES # BLD AUTO: 0.05 K/UL (ref 0–0.11)
IMM GRANULOCYTES NFR BLD AUTO: 0.6 % (ref 0–0.9)
LYMPHOCYTES # BLD AUTO: 2.79 K/UL (ref 1–4.8)
LYMPHOCYTES NFR BLD: 34.9 % (ref 22–41)
MCH RBC QN AUTO: 25.7 PG (ref 27–33)
MCHC RBC AUTO-ENTMCNC: 31.3 G/DL (ref 33.6–35)
MCV RBC AUTO: 82.2 FL (ref 81.4–97.8)
MONOCYTES # BLD AUTO: 0.42 K/UL (ref 0–0.85)
MONOCYTES NFR BLD AUTO: 5.3 % (ref 0–13.4)
NEUTROPHILS # BLD AUTO: 4.52 K/UL (ref 2–7.15)
NEUTROPHILS NFR BLD: 56.4 % (ref 44–72)
NRBC # BLD AUTO: 0 K/UL
NRBC BLD-RTO: 0 /100 WBC
PLATELET # BLD AUTO: 239 K/UL (ref 164–446)
PMV BLD AUTO: 11.5 FL (ref 9–12.9)
POTASSIUM SERPL-SCNC: 3.4 MMOL/L (ref 3.6–5.5)
PROT SERPL-MCNC: 5.8 G/DL (ref 6–8.2)
RBC # BLD AUTO: 4.67 M/UL (ref 4.2–5.4)
SODIUM SERPL-SCNC: 141 MMOL/L (ref 135–145)
WBC # BLD AUTO: 8 K/UL (ref 4.8–10.8)

## 2020-03-04 PROCEDURE — 700102 HCHG RX REV CODE 250 W/ 637 OVERRIDE(OP): Performed by: INTERNAL MEDICINE

## 2020-03-04 PROCEDURE — 700102 HCHG RX REV CODE 250 W/ 637 OVERRIDE(OP): Performed by: HOSPITALIST

## 2020-03-04 PROCEDURE — A9270 NON-COVERED ITEM OR SERVICE: HCPCS | Performed by: HOSPITALIST

## 2020-03-04 PROCEDURE — A9270 NON-COVERED ITEM OR SERVICE: HCPCS | Performed by: INTERNAL MEDICINE

## 2020-03-04 PROCEDURE — 85025 COMPLETE CBC W/AUTO DIFF WBC: CPT

## 2020-03-04 PROCEDURE — 99239 HOSP IP/OBS DSCHRG MGMT >30: CPT | Performed by: INTERNAL MEDICINE

## 2020-03-04 PROCEDURE — 80053 COMPREHEN METABOLIC PANEL: CPT

## 2020-03-04 RX ORDER — CIPROFLOXACIN 500 MG/1
500 TABLET, FILM COATED ORAL 2 TIMES DAILY
Qty: 10 TAB | Refills: 0 | Status: SHIPPED | OUTPATIENT
Start: 2020-03-04 | End: 2020-03-09

## 2020-03-04 RX ORDER — METRONIDAZOLE 500 MG/1
500 TABLET ORAL EVERY 8 HOURS
Qty: 15 TAB | Refills: 0 | Status: SHIPPED | OUTPATIENT
Start: 2020-03-04 | End: 2020-03-09

## 2020-03-04 RX ADMIN — SUCRALFATE 1 G: 1 SUSPENSION ORAL at 05:29

## 2020-03-04 RX ADMIN — METRONIDAZOLE 500 MG: 500 TABLET ORAL at 05:29

## 2020-03-04 ASSESSMENT — FIBROSIS 4 INDEX: FIB4 SCORE: 1.83

## 2020-03-04 NOTE — CARE PLAN
Problem: Infection  Goal: Will remain free from infection  Outcome: PROGRESSING AS EXPECTED  Note: Assess and monitor for signs and symptoms of infection. Perform hand hygiene before and after patient contact and entering/exiting the room.     Problem: Psychosocial Needs:  Goal: Level of anxiety will decrease  Outcome: PROGRESSING AS EXPECTED  Flowsheets (Taken 3/3/2020 2100)  Patient Behaviors: Anxious  Note: Encourage patient to verbalize concerns/needs. Provide active listening and assistance. Offer distractions and use PRN medication as prescribed.

## 2020-03-04 NOTE — PROGRESS NOTES
Patient given discharge information verbalized understanding. IV discontinued. Patient taken to front lobby via ambulation accompanied by self.

## 2020-03-04 NOTE — PROGRESS NOTES
Report received from Jed CORTES. No new changes. Per report, patient is really wanting to get discharged today. No c/o abd pain overnight. Did have a headache. Tylenol given. Still on clears. Refused IVF because she felt claustrophobic. K+ 3.4 today. Bed in low locked position, call bell within reach. Continue to monitor.

## 2020-03-04 NOTE — DISCHARGE INSTRUCTIONS
Discharge Instructions    Discharged to home by car with relative. Discharged via walking, hospital escort: Refused.  Special equipment needed: Not Applicable    Be sure to schedule a follow-up appointment with your primary care doctor or any specialists as instructed.     Discharge Plan:   Influenza Vaccine Indication: Patient Refuses    I understand that a diet low in cholesterol, fat, and sodium is recommended for good health. Unless I have been given specific instructions below for another diet, I accept this instruction as my diet prescription.   Other diet: GI soft    Special Instructions: None    · Is patient discharged on Warfarin / Coumadin?   No     Depression / Suicide Risk    As you are discharged from this [facility] facility, it is important to learn how to keep safe from harming yourself.    Recognize the warning signs:  · Abrupt changes in personality, positive or negative- including increase in energy   · Giving away possessions  · Change in eating patterns- significant weight changes-  positive or negative  · Change in sleeping patterns- unable to sleep or sleeping all the time   · Unwillingness or inability to communicate  · Depression  · Unusual sadness, discouragement and loneliness  · Talk of wanting to die  · Neglect of personal appearance   · Rebelliousness- reckless behavior  · Withdrawal from people/activities they love  · Confusion- inability to concentrate     If you or a loved one observes any of these behaviors or has concerns about self-harm, here's what you can do:  · Talk about it- your feelings and reasons for harming yourself  · Remove any means that you might use to hurt yourself (examples: pills, rope, extension cords, firearm)  · Get professional help from the community (Mental Health, Substance Abuse, psychological counseling)  · Do not be alone:Call your Safe Contact- someone whom you trust who will be there for you.  · Call your local CRISIS HOTLINE 509-4884 or  771.713.2071  · Call your local Children's Mobile Crisis Response Team [place] (465) 090-5057 or www.[facility] [facility] [employer]  · Call the toll free National Suicide Prevention Hotlines   · National Suicide Prevention Lifeline 587-909-WFFY (1115)  · [place] Line Network 800-SUICIDE (990-4981)      Colitis  Introduction  Colitis is inflammation of the colon. Colitis may last a short time (acute) or it may last a long time (chronic).  What are the causes?  This condition may be caused by:  · Viruses.  · Bacteria.  · Reactions to medicine.  · Certain autoimmune diseases, such as Crohn disease or ulcerative colitis.  What are the signs or symptoms?  Symptoms of this condition include:  · Diarrhea.  · Passing bloody or tarry stool.  · Pain.  · Fever.  · Vomiting.  · Tiredness (fatigue).  · Weight loss.  · Bloating.  · Sudden increase in abdominal pain.  · Having fewer bowel movements than usual.  How is this diagnosed?  This condition is diagnosed with a stool test or a blood test. You may also have other tests, including X-rays, a CT scan, or a colonoscopy.  How is this treated?  Treatment may include:  · Resting the bowel. This involves not eating or drinking for a period of time.  · Fluids that are given through an IV tube.  · Medicine for pain and diarrhea.  · Antibiotic medicines.  · Cortisone medicines.  · Surgery.  Follow these instructions at home:  Eating and drinking  · Follow instructions from your health care provider about eating or drinking restrictions.  · Drink enough fluid to keep your urine clear or pale yellow.  · Work with a dietitian to determine which foods cause your condition to flare up.  · Avoid foods that cause flare-ups.  · Eat a well-balanced diet.  Medicines  · Take over-the-counter and prescription medicines only as told by your health care provider.  · If you were prescribed an antibiotic medicine, take it as told by your health care provider. Do not stop taking the antibiotic even if  you start to feel better.  General instructions  · Keep all follow-up visits as told by your health care provider. This is important.  Contact a health care provider if:  · Your symptoms do not go away.  · You develop new symptoms.  Get help right away if:  · You have a fever that does not go away with treatment.  · You develop chills.  · You have extreme weakness, fainting, or dehydration.  · You have repeated vomiting.  · You develop severe pain in your abdomen.  · You pass bloody or tarry stool.  This information is not intended to replace advice given to you by your health care provider. Make sure you discuss any questions you have with your health care provider.  Document Released: 01/25/2006 Document Revised: 05/25/2017 Document Reviewed: 04/11/2016  © 2017 [employer]vier

## 2020-03-04 NOTE — PROGRESS NOTES
Completed assessment and administered scheduled medications. Bed in low position, locked, and appropriate alarms set. Personal belongings and call light are within reach. Patient has no additional needs at this time.     Patient still anxious but feels better and will hopefully sleep.

## 2020-03-04 NOTE — PROGRESS NOTES
"Dr. Martinez notified that patient did not want IV fluids running at this time because it made patient anxious and feel \"trapped\".  Ok to hold fluids at this time.  "

## 2020-03-04 NOTE — PROGRESS NOTES
"Received bedside report from KIMBER RN. Plan of care discussed. Safety precautions in place. Call light and personal belongings within reach. Patient has no needs at this time.     Patient is anxious and at this time is wanting to leave. She is willing to sign AMA. Education provided on the bowel resting and pain control. Patient wants to be able to be discharged with antibiotic or what is needed. Patient showed the family where she can go so she doesn't feel \"trapped\" and wants to be with her kids.     Patient agreeable to stay at this time. Active listing provided. PRN Xanax given.  "

## 2020-03-04 NOTE — DISCHARGE SUMMARY
Discharge Summary    CHIEF COMPLAINT ON ADMISSION  Chief Complaint   Patient presents with   • Abdominal Pain     abdominal pain for a few weeks but getting worse  no vomiting or diarrhea       Reason for Admission  Abdominal Pain     Admission Date  3/2/2020    CODE STATUS  Prior    HPI & HOSPITAL COURSE  This is a 29 y.o. female here with Past medical history of anxiety presented with complaint of abdominal pain.  She was diagnosed with colitis by CT.  Patient was put on IV Rocephin and Flagyl.  Patient also was treated with bowel rest and IV fluid resuscitation.  Patient responded to treatment very well.  Patient's abdominal pain improved significantly.  Patient tolerated oral intake.  Patient currently wants to go home.  Patient has minimal symptoms.    Therefore, she is discharged in fair and stable condition to home with close outpatient follow-up.    The patient met 2-midnight criteria for an inpatient stay at the time of discharge.    Discharge Date  3/4/2020    FOLLOW UP ITEMS POST DISCHARGE  none    DISCHARGE DIAGNOSES  Principal Problem (Resolved):    Colitis POA: Yes  Active Problems:    Hypokalemia POA: Yes    Anxiety disorder POA: Yes    Asthma POA: Yes    AP (abdominal pain) POA: Yes      FOLLOW UP  No future appointments.  Satish Stephenson M.D.  [address] [place] [address] [address] [place] [id number]  [phone]    Go on 3/12/2020  Please check in at 3:15pm for a hospital folllow up with ESSENCE Wagner. Thank you!       MEDICATIONS ON DISCHARGE     Medication List      START taking these medications      Instructions   ciprofloxacin 500 MG Tabs  Commonly known as:  CIPRO   Take 1 Tab by mouth 2 times a day for 5 days.  Dose:  500 mg     metroNIDAZOLE 500 MG Tabs  Commonly known as:  FLAGYL   Take 1 Tab by mouth every 8 hours for 5 days.  Dose:  500 mg        CONTINUE taking these medications      Instructions   albuterol 108 (90 Base) MCG/ACT Aers inhalation aerosol   Inhale 2 Puffs by mouth every 6  "hours as needed for Shortness of Breath.  Dose:  2 Puff     ALPRAZolam 0.5 MG Tabs  Commonly known as:  XANAX   Take 0.5 mg by mouth 3 times a day as needed for Sleep or Anxiety.  Dose:  0.5 mg     budesonide-formoterol 160-4.5 MCG/ACT Aero  Commonly known as:  SYMBICORT   Inhale 2 Puffs by mouth 2 Times a Day.  Dose:  2 Puff     Orilissa 200 MG Tabs  Generic drug:  Elagolix Sodium   Take 200 mg by mouth 2 Times a Day.  Dose:  200 mg     traZODone 50 MG Tabs  Commonly known as:  DESYREL   Take 50 mg by mouth every evening.  Dose:  50 mg        STOP taking these medications    methylPREDNISolone 4 MG Tbpk  Commonly known as:  MEDROL DOSEPAK            Allergies  Allergies   Allergen Reactions   • Aleve [Naproxen Sodium] Rash     \"rash\"   • Betadine [Povidone Iodine] Rash     \"rash, hives\"   • Latex Rash     Rash   • Sulfamethoxazole W-Trimethoprim Vomiting     \"vomiting\"       DIET  No orders of the defined types were placed in this encounter.      ACTIVITY  As tolerated.  Weight bearing as tolerated    CONSULTATIONS  none    PROCEDURES  None    CT-ABDOMEN-PELVIS WITH   Final Result      1.  Mild diffusely thickened wall of the colon which may represent presence of infectious colitis and can also be seen with inflammatory bowel disease.      2.  Normal appendix.      3.  Fatty liver. Number of small low-density focus within the right lobe of the liver inferiorly likely representing hemangioma.        PE:  Gen: AAOx3, NAD  Eyes: PELLA  Neck: no JVD, no lymphadenopathy  Cardia: RRR, no mrg  Lungs: CTAB, no rales, rhonci or wheezing  Abd: NABS, soft, non extended, no mass  EXT: No C/C/E, peripheral pulse 2+ b/l  Neuro: CN II-XII intact, non focal, reflex 2+ symmetrical  Skin: Intact, no lesion, warm  Psych: Appropriate.      LABORATORY  Lab Results   Component Value Date    SODIUM 141 03/04/2020    POTASSIUM 3.4 (L) 03/04/2020    CHLORIDE 108 03/04/2020    CO2 26 03/04/2020    GLUCOSE 90 03/04/2020    BUN 5 (L) " 03/04/2020    CREATININE 0.62 03/04/2020        Lab Results   Component Value Date    WBC 8.0 03/04/2020    HEMOGLOBIN 12.0 03/04/2020    HEMATOCRIT 38.4 03/04/2020    PLATELETCT 239 03/04/2020        Total time of the discharge process exceeds 38 minutes.

## 2020-03-05 LAB
CALPROTECTIN STL-MCNT: <16 UG/G
LACTOFERRIN STL QL IA: NEGATIVE

## 2020-03-08 LAB
BACTERIA BLD CULT: NORMAL
BACTERIA BLD CULT: NORMAL
SIGNIFICANT IND 70042: NORMAL
SIGNIFICANT IND 70042: NORMAL
SITE SITE: NORMAL
SITE SITE: NORMAL
SOURCE SOURCE: NORMAL
SOURCE SOURCE: NORMAL

## 2020-03-18 ENCOUNTER — OFFICE VISIT (OUTPATIENT)
Dept: PULMONOLOGY | Facility: HOSPICE | Age: 30
End: 2020-03-18
Payer: COMMERCIAL

## 2020-03-18 VITALS
HEIGHT: 59 IN | HEART RATE: 68 BPM | BODY MASS INDEX: 46.37 KG/M2 | TEMPERATURE: 97.9 F | DIASTOLIC BLOOD PRESSURE: 76 MMHG | SYSTOLIC BLOOD PRESSURE: 114 MMHG | RESPIRATION RATE: 16 BRPM | OXYGEN SATURATION: 97 % | WEIGHT: 230 LBS

## 2020-03-18 DIAGNOSIS — J45.30 MILD PERSISTENT ASTHMA WITHOUT COMPLICATION: ICD-10-CM

## 2020-03-18 DIAGNOSIS — R06.02 SOB (SHORTNESS OF BREATH): ICD-10-CM

## 2020-03-18 PROCEDURE — 99244 OFF/OP CNSLTJ NEW/EST MOD 40: CPT | Performed by: INTERNAL MEDICINE

## 2020-03-18 RX ORDER — BUDESONIDE AND FORMOTEROL FUMARATE DIHYDRATE 160; 4.5 UG/1; UG/1
2 AEROSOL RESPIRATORY (INHALATION) 2 TIMES DAILY
Qty: 1 INHALER | Refills: 11 | Status: SHIPPED | OUTPATIENT
Start: 2020-03-18 | End: 2020-06-01

## 2020-03-18 RX ORDER — BACLOFEN 10 MG/1
TABLET ORAL
COMMUNITY
Start: 2020-02-06 | End: 2020-06-01

## 2020-03-18 RX ORDER — CEFDINIR 300 MG/1
300 CAPSULE ORAL
COMMUNITY
Start: 2020-02-26 | End: 2020-06-01

## 2020-03-18 RX ORDER — FLUCONAZOLE 200 MG/1
TABLET ORAL
COMMUNITY
Start: 2020-01-09 | End: 2020-06-01

## 2020-03-18 RX ORDER — AZITHROMYCIN 500 MG/1
TABLET, FILM COATED ORAL
COMMUNITY
Start: 2020-01-09 | End: 2020-06-01

## 2020-03-18 RX ORDER — BUDESONIDE AND FORMOTEROL FUMARATE DIHYDRATE 160; 4.5 UG/1; UG/1
2 AEROSOL RESPIRATORY (INHALATION) 2 TIMES DAILY
Qty: 1 INHALER | Refills: 11 | Status: SHIPPED | OUTPATIENT
Start: 2020-03-18 | End: 2020-06-25 | Stop reason: SDUPTHER

## 2020-03-18 RX ORDER — TIZANIDINE 4 MG/1
TABLET ORAL
COMMUNITY
Start: 2020-02-04 | End: 2020-06-01

## 2020-03-18 ASSESSMENT — FIBROSIS 4 INDEX: FIB4 SCORE: 0.53

## 2020-03-18 ASSESSMENT — PAIN SCALES - GENERAL: PAINLEVEL: NO PAIN

## 2020-03-18 NOTE — PROGRESS NOTES
Chief Complaint   Patient presents with   • New Patient     Asthma       HPI:  The patient is a 29-year-old woman who was diagnosed with asthma in 2015.  She has allergies to certain medications including Aleve, sulfa, and latex.  She has no known allergies to pollens or animals.  She does complain of a chronic cough and wheezing several times per week.  She does cough with significant exertion.  She was treated recently with an antibiotic and a brief course of Symbicort.  She tells me that she was told to discontinue the Symbicort when she discontinued the antibiotic.  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  She tells me that she had previous pulmonary function studies but we do not have access to them at this time.  She had a recent hospitalization for colitis.  She had a  section last .    Past Medical History:   Diagnosis Date   • Asthma    • Back pain    • Bronchitis    • Chickenpox    • Cough    • Depression    • Influenza    • Wheezing        ROS:   Constitutional: Denies fevers, chills, night sweats, fatigue or weight loss  Eyes: Denies vision loss, pain, drainage, double vision  Ears, Nose, Throat: Denies earache, tinnitus, hoarseness  Cardiovascular: Denies chest pain, tightness, palpitations  Respiratory: See HPI  Sleep: Denies, snoring, apnea  GI: Denies abdominal pain, nausea, vomiting, diarrhea  : Denies frequent urination, hematuria, painful urination  Musculoskeletal: Denies back pain, painful joints, sore muscles  Neurological: Denies headaches, seizures  Skin: Denies rashes, color changes  Psychiatric: Denies depression or thoughts of suicide  Hematologic: Denies bleeding tendency or clotting tendency  Allergic/Immunologic: Denies rhinitis, skin sensitivity    Social History     Socioeconomic History   • Marital status: Single     Spouse name: Not on file   • Number of children: Not on file   • Years of education: Not on  file   • Highest education level: Not on file   Occupational History   • Not on file   Social Needs   • Financial resource strain: Not on file   • Food insecurity     Worry: Not on file     Inability: Not on file   • Transportation needs     Medical: Not on file     Non-medical: Not on file   Tobacco Use   • Smoking status: Former Smoker     Packs/day: 0.50     Types: Cigarettes     Last attempt to quit: 3/2/2015     Years since quittin.0   • Smokeless tobacco: Never Used   Substance and Sexual Activity   • Alcohol use: No   • Drug use: No   • Sexual activity: Not on file   Lifestyle   • Physical activity     Days per week: Not on file     Minutes per session: Not on file   • Stress: Not on file   Relationships   • Social connections     Talks on phone: Not on file     Gets together: Not on file     Attends Restorationist service: Not on file     Active member of club or organization: Not on file     Attends meetings of clubs or organizations: Not on file     Relationship status: Not on file   • Intimate partner violence     Fear of current or ex partner: Not on file     Emotionally abused: Not on file     Physically abused: Not on file     Forced sexual activity: Not on file   Other Topics Concern   • Not on file   Social History Narrative   • Not on file     Aleve [naproxen sodium]; Betadine [povidone iodine]; Latex; and Sulfamethoxazole w-trimethoprim  Current Outpatient Medications on File Prior to Visit   Medication Sig Dispense Refill   • fluconazole (DIFLUCAN) 200 MG Tab TAKE 1 TABLET BY MOUTH NOW     • tizanidine (ZANAFLEX) 4 MG Tab      • ALPRAZolam (XANAX) 0.5 MG Tab Take 0.5 mg by mouth 3 times a day as needed for Sleep or Anxiety.     • traZODone (DESYREL) 50 MG Tab Take 50 mg by mouth every evening.     • Elagolix Sodium (ORILISSA) 200 MG Tab Take 200 mg by mouth 2 Times a Day.     • albuterol 108 (90 Base) MCG/ACT Aero Soln inhalation aerosol Inhale 2 Puffs by mouth every 6 hours as needed for Shortness  "of Breath. 8.5 g 0   • azithromycin (ZITHROMAX) 500 MG tablet TAKE 2TABLETS (1000MG) BY MOUTH JUST FOR ONE DOSE     • baclofen (LIORESAL) 10 MG Tab      • cefdinir (OMNICEF) 300 MG Cap Take 300 mg by mouth.     • budesonide-formoterol (SYMBICORT) 160-4.5 MCG/ACT Aerosol Inhale 2 Puffs by mouth 2 Times a Day.       No current facility-administered medications on file prior to visit.      /76 (BP Location: Left arm, Patient Position: Sitting, BP Cuff Size: Adult)   Pulse 68   Temp 36.6 °C (97.9 °F) (Oral)   Resp 16   Ht 1.499 m (4' 11\")   Wt 104.3 kg (230 lb)   SpO2 97%   Family History   Problem Relation Age of Onset   • Thyroid Mother    • Diabetes Father    • Thyroid Brother        Physical Exam:  No distress at rest on room air.  BMI is 46.45  HEENT: PERRLA, EOMI, no scleral icterus, no nasal or oral lesions  Neck: No thyromegaly, no adenopathy, no bruits  Mallampatti: Grade II  Lungs: Equal breath sounds, no wheezes or crackles  Heart: Regular rate and rhythm, no gallops or murmurs  Abdomen: Soft, benign, no organomegaly  Extremities: No clubbing, cyanosis, or edema  Neurologic: Cranial nerve, motor, and sensory exam are normal    1. SOB (shortness of breath)    2. Mild persistent asthma without complication        She appears to have evidence of mild persistent asthma.  We did explain the difference between bronchospasm and inflammation.  We have asked her to use her Symbicort 160/4.5 on a regular basis over the next 6 months.  We talked about the need for chronic anti-inflammatory therapy.  She will continue to use albuterol as a rescue inhaler.  We will see her back in several months with pulmonary function testing at that time.  "

## 2020-05-26 ENCOUNTER — HOSPITAL ENCOUNTER (OUTPATIENT)
Dept: LAB | Facility: MEDICAL CENTER | Age: 30
End: 2020-05-26
Attending: INTERNAL MEDICINE
Payer: COMMERCIAL

## 2020-05-26 ENCOUNTER — HOSPITAL ENCOUNTER (OUTPATIENT)
Dept: LAB | Facility: MEDICAL CENTER | Age: 30
End: 2020-05-26
Attending: OBSTETRICS & GYNECOLOGY
Payer: COMMERCIAL

## 2020-05-26 LAB
ALBUMIN SERPL BCP-MCNC: 4.5 G/DL (ref 3.2–4.9)
ALBUMIN/GLOB SERPL: 1.4 G/DL
ALP SERPL-CCNC: 102 U/L (ref 30–99)
ALT SERPL-CCNC: 17 U/L (ref 2–50)
ANION GAP SERPL CALC-SCNC: 12 MMOL/L (ref 7–16)
AST SERPL-CCNC: 18 U/L (ref 12–45)
BASOPHILS # BLD AUTO: 0.4 % (ref 0–1.8)
BASOPHILS # BLD: 0.04 K/UL (ref 0–0.12)
BILIRUB SERPL-MCNC: 0.4 MG/DL (ref 0.1–1.5)
BUN SERPL-MCNC: 12 MG/DL (ref 8–22)
CALCIUM SERPL-MCNC: 9.4 MG/DL (ref 8.5–10.5)
CHLORIDE SERPL-SCNC: 106 MMOL/L (ref 96–112)
CHOLEST SERPL-MCNC: 191 MG/DL (ref 100–199)
CO2 SERPL-SCNC: 23 MMOL/L (ref 20–33)
CREAT SERPL-MCNC: 0.74 MG/DL (ref 0.5–1.4)
EOSINOPHIL # BLD AUTO: 0.33 K/UL (ref 0–0.51)
EOSINOPHIL NFR BLD: 3.5 % (ref 0–6.9)
ERYTHROCYTE [DISTWIDTH] IN BLOOD BY AUTOMATED COUNT: 44 FL (ref 35.9–50)
FASTING STATUS PATIENT QL REPORTED: NORMAL
GLOBULIN SER CALC-MCNC: 3.3 G/DL (ref 1.9–3.5)
GLUCOSE SERPL-MCNC: 92 MG/DL (ref 65–99)
HCT VFR BLD AUTO: 45.3 % (ref 37–47)
HDLC SERPL-MCNC: 43 MG/DL
HGB BLD-MCNC: 14.1 G/DL (ref 12–16)
IMM GRANULOCYTES # BLD AUTO: 0.04 K/UL (ref 0–0.11)
IMM GRANULOCYTES NFR BLD AUTO: 0.4 % (ref 0–0.9)
LDLC SERPL CALC-MCNC: 131 MG/DL
LYMPHOCYTES # BLD AUTO: 2.75 K/UL (ref 1–4.8)
LYMPHOCYTES NFR BLD: 28.8 % (ref 22–41)
MCH RBC QN AUTO: 26.2 PG (ref 27–33)
MCHC RBC AUTO-ENTMCNC: 31.1 G/DL (ref 33.6–35)
MCV RBC AUTO: 84 FL (ref 81.4–97.8)
MONOCYTES # BLD AUTO: 0.41 K/UL (ref 0–0.85)
MONOCYTES NFR BLD AUTO: 4.3 % (ref 0–13.4)
NEUTROPHILS # BLD AUTO: 5.99 K/UL (ref 2–7.15)
NEUTROPHILS NFR BLD: 62.6 % (ref 44–72)
NRBC # BLD AUTO: 0 K/UL
NRBC BLD-RTO: 0 /100 WBC
PLATELET # BLD AUTO: 277 K/UL (ref 164–446)
PMV BLD AUTO: 12.3 FL (ref 9–12.9)
POTASSIUM SERPL-SCNC: 4 MMOL/L (ref 3.6–5.5)
PROT SERPL-MCNC: 7.8 G/DL (ref 6–8.2)
RBC # BLD AUTO: 5.39 M/UL (ref 4.2–5.4)
SODIUM SERPL-SCNC: 141 MMOL/L (ref 135–145)
TRIGL SERPL-MCNC: 87 MG/DL (ref 0–149)
WBC # BLD AUTO: 9.6 K/UL (ref 4.8–10.8)

## 2020-05-26 PROCEDURE — 82542 COL CHROMOTOGRAPHY QUAL/QUAN: CPT

## 2020-05-26 PROCEDURE — 36415 COLL VENOUS BLD VENIPUNCTURE: CPT

## 2020-05-26 PROCEDURE — 80053 COMPREHEN METABOLIC PANEL: CPT

## 2020-05-26 PROCEDURE — 82784 ASSAY IGA/IGD/IGG/IGM EACH: CPT

## 2020-05-26 PROCEDURE — 80061 LIPID PANEL: CPT

## 2020-05-26 PROCEDURE — 85025 COMPLETE CBC W/AUTO DIFF WBC: CPT

## 2020-05-26 PROCEDURE — 82306 VITAMIN D 25 HYDROXY: CPT

## 2020-05-26 PROCEDURE — 84443 ASSAY THYROID STIM HORMONE: CPT

## 2020-05-26 PROCEDURE — 82607 VITAMIN B-12: CPT

## 2020-05-26 PROCEDURE — 83516 IMMUNOASSAY NONANTIBODY: CPT

## 2020-05-26 PROCEDURE — 86140 C-REACTIVE PROTEIN: CPT

## 2020-05-27 LAB
25(OH)D3 SERPL-MCNC: 15 NG/ML (ref 30–100)
CRP SERPL HS-MCNC: 0.72 MG/DL (ref 0–0.75)
TSH SERPL DL<=0.005 MIU/L-ACNC: 3.02 UIU/ML (ref 0.38–5.33)
VIT B12 SERPL-MCNC: 224 PG/ML (ref 211–911)

## 2020-05-28 LAB — IGA SERPL-MCNC: 217 MG/DL (ref 68–408)

## 2020-05-29 DIAGNOSIS — Z01.812 PRE-OPERATIVE LABORATORY EXAMINATION: ICD-10-CM

## 2020-05-29 LAB — COVID ORDER STATUS COVID19: NORMAL

## 2020-05-29 PROCEDURE — C9803 HOPD COVID-19 SPEC COLLECT: HCPCS

## 2020-05-30 LAB — GLIADIN PEPTIDE+TTG IGA+IGG SER QL IA: 6 UNITS (ref 0–19)

## 2020-06-01 LAB
SARS-COV-2 RNA RESP QL NAA+PROBE: NOT DETECTED
SPECIMEN SOURCE: NORMAL

## 2020-06-01 NOTE — OR NURSING
"Preadmit appointment: \" Preparing for your Procedure information\" sheet given to patient with verbal and written instructions. Patient instructed to continue prescribed medications through the day before surgery, instructed to take the following medications the day of surgery per anesthesia protocol: xanax, albuterol, symbicort.   "

## 2020-06-02 ENCOUNTER — ANESTHESIA (OUTPATIENT)
Dept: SURGERY | Facility: MEDICAL CENTER | Age: 30
End: 2020-06-02
Payer: COMMERCIAL

## 2020-06-02 ENCOUNTER — ANESTHESIA EVENT (OUTPATIENT)
Dept: SURGERY | Facility: MEDICAL CENTER | Age: 30
End: 2020-06-02
Payer: COMMERCIAL

## 2020-06-02 ENCOUNTER — HOSPITAL ENCOUNTER (OUTPATIENT)
Facility: MEDICAL CENTER | Age: 30
End: 2020-06-02
Attending: INTERNAL MEDICINE | Admitting: INTERNAL MEDICINE
Payer: COMMERCIAL

## 2020-06-02 VITALS
BODY MASS INDEX: 48.93 KG/M2 | TEMPERATURE: 97.9 F | HEART RATE: 72 BPM | OXYGEN SATURATION: 96 % | SYSTOLIC BLOOD PRESSURE: 130 MMHG | WEIGHT: 242.73 LBS | DIASTOLIC BLOOD PRESSURE: 71 MMHG | HEIGHT: 59 IN | RESPIRATION RATE: 16 BRPM

## 2020-06-02 LAB
HCG UR QL: NEGATIVE
PATHOLOGY CONSULT NOTE: NORMAL

## 2020-06-02 PROCEDURE — 88305 TISSUE EXAM BY PATHOLOGIST: CPT

## 2020-06-02 PROCEDURE — 160048 HCHG OR STATISTICAL LEVEL 1-5: Performed by: INTERNAL MEDICINE

## 2020-06-02 PROCEDURE — 160046 HCHG PACU - 1ST 60 MINS PHASE II: Performed by: INTERNAL MEDICINE

## 2020-06-02 PROCEDURE — 81025 URINE PREGNANCY TEST: CPT

## 2020-06-02 PROCEDURE — 700105 HCHG RX REV CODE 258: Performed by: INTERNAL MEDICINE

## 2020-06-02 PROCEDURE — 160025 RECOVERY II MINUTES (STATS): Performed by: INTERNAL MEDICINE

## 2020-06-02 PROCEDURE — 700111 HCHG RX REV CODE 636 W/ 250 OVERRIDE (IP): Performed by: ANESTHESIOLOGY

## 2020-06-02 PROCEDURE — 160009 HCHG ANES TIME/MIN: Performed by: INTERNAL MEDICINE

## 2020-06-02 PROCEDURE — 160203 HCHG ENDO MINUTES - 1ST 30 MINS LEVEL 4: Performed by: INTERNAL MEDICINE

## 2020-06-02 PROCEDURE — 160035 HCHG PACU - 1ST 60 MINS PHASE I: Performed by: INTERNAL MEDICINE

## 2020-06-02 PROCEDURE — 160208 HCHG ENDO MINUTES - EA ADDL 1 MIN LEVEL 4: Performed by: INTERNAL MEDICINE

## 2020-06-02 PROCEDURE — 160002 HCHG RECOVERY MINUTES (STAT): Performed by: INTERNAL MEDICINE

## 2020-06-02 PROCEDURE — 700101 HCHG RX REV CODE 250: Performed by: INTERNAL MEDICINE

## 2020-06-02 RX ORDER — DEXAMETHASONE SODIUM PHOSPHATE 4 MG/ML
INJECTION, SOLUTION INTRA-ARTICULAR; INTRALESIONAL; INTRAMUSCULAR; INTRAVENOUS; SOFT TISSUE PRN
Status: DISCONTINUED | OUTPATIENT
Start: 2020-06-02 | End: 2020-06-02 | Stop reason: SURG

## 2020-06-02 RX ORDER — DIPHENHYDRAMINE HYDROCHLORIDE 50 MG/ML
12.5 INJECTION INTRAMUSCULAR; INTRAVENOUS
Status: DISCONTINUED | OUTPATIENT
Start: 2020-06-02 | End: 2020-06-02 | Stop reason: HOSPADM

## 2020-06-02 RX ORDER — HALOPERIDOL 5 MG/ML
1 INJECTION INTRAMUSCULAR
Status: DISCONTINUED | OUTPATIENT
Start: 2020-06-02 | End: 2020-06-02 | Stop reason: HOSPADM

## 2020-06-02 RX ORDER — OXYCODONE HYDROCHLORIDE AND ACETAMINOPHEN 5; 325 MG/1; MG/1
1 TABLET ORAL
Status: DISCONTINUED | OUTPATIENT
Start: 2020-06-02 | End: 2020-06-02 | Stop reason: HOSPADM

## 2020-06-02 RX ORDER — SUCCINYLCHOLINE/SOD CL,ISO/PF 200MG/10ML
SYRINGE (ML) INTRAVENOUS PRN
Status: DISCONTINUED | OUTPATIENT
Start: 2020-06-02 | End: 2020-06-02 | Stop reason: SURG

## 2020-06-02 RX ORDER — SODIUM CHLORIDE, SODIUM LACTATE, POTASSIUM CHLORIDE, CALCIUM CHLORIDE 600; 310; 30; 20 MG/100ML; MG/100ML; MG/100ML; MG/100ML
INJECTION, SOLUTION INTRAVENOUS CONTINUOUS
Status: DISCONTINUED | OUTPATIENT
Start: 2020-06-02 | End: 2020-06-02 | Stop reason: HOSPADM

## 2020-06-02 RX ORDER — ONDANSETRON 2 MG/ML
INJECTION INTRAMUSCULAR; INTRAVENOUS PRN
Status: DISCONTINUED | OUTPATIENT
Start: 2020-06-02 | End: 2020-06-02 | Stop reason: SURG

## 2020-06-02 RX ORDER — OXYCODONE HYDROCHLORIDE AND ACETAMINOPHEN 5; 325 MG/1; MG/1
2 TABLET ORAL
Status: DISCONTINUED | OUTPATIENT
Start: 2020-06-02 | End: 2020-06-02 | Stop reason: HOSPADM

## 2020-06-02 RX ADMIN — ONDANSETRON 4 MG: 2 INJECTION INTRAMUSCULAR; INTRAVENOUS at 08:06

## 2020-06-02 RX ADMIN — FENTANYL CITRATE 100 MCG: 50 INJECTION, SOLUTION INTRAMUSCULAR; INTRAVENOUS at 07:48

## 2020-06-02 RX ADMIN — PROPOFOL 200 MG: 10 INJECTION, EMULSION INTRAVENOUS at 07:50

## 2020-06-02 RX ADMIN — DEXAMETHASONE SODIUM PHOSPHATE 8 MG: 4 INJECTION, SOLUTION INTRAMUSCULAR; INTRAVENOUS at 08:05

## 2020-06-02 RX ADMIN — SODIUM CHLORIDE, POTASSIUM CHLORIDE, SODIUM LACTATE AND CALCIUM CHLORIDE: 600; 310; 30; 20 INJECTION, SOLUTION INTRAVENOUS at 06:46

## 2020-06-02 RX ADMIN — Medication 100 MG: at 07:50

## 2020-06-02 RX ADMIN — LIDOCAINE HYDROCHLORIDE 0.5 ML: 10 INJECTION, SOLUTION INFILTRATION; PERINEURAL at 06:58

## 2020-06-02 ASSESSMENT — FIBROSIS 4 INDEX: FIB4 SCORE: 0.5

## 2020-06-02 ASSESSMENT — PAIN SCALES - GENERAL: PAIN_LEVEL: 0

## 2020-06-02 NOTE — OR NURSING
Pt allergies and NPO status verified, home meds reconcilled. Belongings secured. Pt verbalizes understanding of the pain scale, expected course of stay, and plan of care.  Surgical site verified with pt.  IV access established.  Urine HCG negative.

## 2020-06-02 NOTE — ANESTHESIA TIME REPORT
Anesthesia Start and Stop Event Times     Date Time Event    6/2/2020 0727 Ready for Procedure     0740 Anesthesia Start     0823 Anesthesia Stop        Responsible Staff  06/02/20    Name Role Begin End    Tito Hendricks M.D. Anesth 0740 0823        Preop Diagnosis (Free Text):  Pre-op Diagnosis     DIARRHEA, COLITIS, NAUSEA AND VOMITTING, LONG TERM CURRENT UES OF NON STERIODAL ANTI IMFLAMMATION        Preop Diagnosis (Codes):  Diagnosis Information     Diagnosis Code(s): Diarrhea [R19.7]        Post op Diagnosis  Colitis      Premium Reason  Non-Premium    Comments:

## 2020-06-02 NOTE — ANESTHESIA QCDR
2019 [facility] Clinical Data Registry (for Quality Improvement)     Postoperative nausea/vomiting risk protocol (Adult = 18 yrs and Pediatric 3-17 yrs)- (430 and 463)  General inhalation anesthetic (NOT TIVA) with PONV risk factors: Yes  Provision of anti-emetic therapy with at least 2 different classes of agents: Yes   Patient DID NOT receive anti-emetic therapy and reason is documented in Medical Record:  N/A    Multimodal Pain Management- (477)  Non-emergent surgery AND patient age >= 18: Yes  Use of Multimodal Pain Management, two or more drugs and/or interventions, NOT including systemic opioids: No  Exception: Documented allergy to multiple classes of analgesics: No       Smoking Abstinence (404)  Patient is current smoker (cigarette, pipe, e-cig, marijuanna): No  Elective Surgery:   Abstinence instructions provided prior to day of surgery:   Patient abstained from smoking on day of surgery:     Pre-Op Beta-Blocker in Isolated CABG (44)  Isolated CABG AND patient age >= 18: No  Beta-blocker admin within 24 hours of surgical incision:   Exception:of medical reason(s) for not administering beta blocker within 24 hours prior to surgical incision (e.g., not  indicated,other medical reason):     PACU assessment of acute postoperative pain prior to Anesthesia Care End- Applies to Patients Age = 18- (ABG7)  Initial PACU pain score is which of the following: < 7/10  Patient unable to report pain score: N/A    Post-anesthetic transfer of care checklist/protocol to PACU/ICU- (426 and 427)  Upon conclusion of case, patient transferred to which of the following locations: PACU/Non-ICU  Use of transfer checklist/protocol: Yes  Exclusion: Service Performed in Patient Hospital Room (and thus did not require transfer): N/A  Unplanned admission to ICU related to anesthesia service up through end of PACU care- (MD51)  Unplanned admission to ICU (not initially anticipated at anesthesia start time): No

## 2020-06-02 NOTE — ANESTHESIA PROCEDURE NOTES
Airway    Date/Time: 6/2/2020 7:50 AM  Performed by: Tito Hendricks M.D.  Authorized by: Tito Hendricks M.D.     Location:  OR  Urgency:  Elective  Indications for Airway Management:  Anesthesia      Spontaneous Ventilation: absent    Sedation Level:  Deep  Preoxygenated: Yes    Patient Position:  Sniffing  Final Airway Type:  Endotracheal airway  Final Endotracheal Airway:  ETT  Cuffed: Yes    Technique Used for Successful ETT Placement:  Direct laryngoscopy    Insertion Site:  Oral  Blade Type:  Yousif  Laryngoscope Blade/Videolaryngoscope Blade Size:  3  ETT Size (mm):  3.0  Measured from:  Teeth  ETT to Teeth (cm):  24  Placement Verified by: auscultation and capnometry    Cormack-Lehane Classification:  Grade IIa - partial view of glottis  Number of Attempts at Approach:  1  Number of Other Approaches Attempted:  0

## 2020-06-02 NOTE — OP REPORT
OP Note  Procedure Date: 6/2/2020     PreOp Diagnosis: Diarrhea/loose stool    PostOp Diagnosis:   Colonoscopy:  Terminal ileum: normal appearance  Colon: Normal mucosa, s/p random cold forceps biopsy; mild diverticulosis in the right colon.    Procedure(s):  COLONOSCOPY - Wound Class: Clean Contaminated    Surgeon(s):  Marck Mcconnell M.D.    Anesthesiologist/Type of Anesthesia:  Anesthesiologist: Tito Hendricks M.D./* No anesthesia type entered *    Surgical Staff:  Circulator: Anthony Childers R.N.  Endoscopy Technician: Paty Washington    Specimens removed if any:  ID Type Source Tests Collected by Time Destination   A : random bx Tissue Colon PATHOLOGY SPECIMEN Marck Mcconnell M.D. 6/2/2020  8:05 AM        Estimated Blood Loss: minimal    Anesthesia/Medications:  see anesthesia note     COMPLICATIONS:  No immediate complications.    PROCEDURE IN DETAIL, Findings and ENDOSCOPIC DIAGNOSIS:      -Prior to the procedure, a History and Physical was performed, and patient medications and allergies were reviewed. The patient’s tolerance of previous anesthesia was also reviewed. The risks and benefits of the procedure and the sedation options and risks were discussed with the patient. All questions were answered, and informed consent was obtained. The patient was deemed in satisfactory condition to undergo the procedure.    -Prior Anticoagulants: the patient has taken no previous anticoagulant or antiplatelet agents.    -ASA Grade Assessment: see anesthesia note     -The patient was placed in the left lateral decubitus position. The scope was passed under direct vision. Continuous oxygen was provided via nasal cannula and intravenous sedation was administered in divided doses throughout the procedure. The patient’s blood pressure, pulse, and oxygen saturation were monitored continuously throughout the procedure.    -The colonoscope was gently introduced through the anus and advanced to  the terminal ileum, identified by appendiceal orifice & ileocecal valve. The scope was then fully withdrawn while examining the color, texture, anatomy and integrity of the mucosa from the cecum to the anal canal. The scope was completely retrieved upon exiting the anal canal and the procedure was terminated. The colonoscopy was performed without difficulty. The patient tolerated the procedure well. The quality of the bowel preparation was good. Further details are in the finding paragraph, based on anatomical location.    -The patient tolerated the procedure well and there were no immediate complications. The patient was then transferred to the recovery room in stable condition.    Findings:  Colonoscopy:  Terminal ileum: normal appearance  Colon: Normal mucosa, s/p random cold forceps biopsy; mild diverticulosis in the right colon.    RECOMMENDATIONS:    1. A letter will be sent regarding to the biopsy result in about 2 weeks.  2. Complete labs as previously ordered  3. Ok to discharge home once criteria met          6/2/2020 8:15 AM Marck Mcconnell M.D.

## 2020-06-02 NOTE — DISCHARGE INSTRUCTIONS
ENDOSCOPY HOME CARE INSTRUCTIONS      COLONOSCOPY OR FLEXIBLE SIGMOIDOSCOPY  1. If you received a barium enema, take a mild laxative such as dulcolax, Jennifer's M.O., or Milk of Magnesia to clean out the barium.  2. Drink plenty of fluids. Eat a diet high in fiber (whole-grain breads, fresh fruit and vegetables).  3. You may notice a few drops of blood with your first bowel movement. If you develop any large amount of bleeding, black stools, a fever, or abdominal pain, call your doctor right away.  4. Call your doctor for test results.  5. Don't drive or drink alcohol for 24 hours. The medication you received will make you too drowsy.  6. No heavy lifting, no Aspirin products or Aspirin for 5 days.    Colonoscopy:  Terminal ileum: normal appearance  Colon: Normal mucosa, s/p random cold forceps biopsy; mild diverticulosis in the right colon.     RECOMMENDATIONS:    1. A letter will be sent regarding to the biopsy result in about 2 weeks.  2. Complete labs as previously ordered.    You should call 911 if you develop problems with breathing or chest pain.  If any questions arise, call your doctor. If your doctor is not available, please feel free to call [phone]. You can also call the HEALTH HOTLINE open 24 hours/day, 7 days/week and speak to a nurse at [phone], or toll free [phone].    Depression / Suicide Risk    As you are discharged from this [facility] facility, it is important to learn how to keep safe from harming yourself.    Recognize the warning signs:  · Abrupt changes in personality, positive or negative- including increase in energy   · Giving away possessions  · Change in eating patterns- significant weight changes-  positive or negative  · Change in sleeping patterns- unable to sleep or sleeping all the time   · Unwillingness or inability to communicate  · Depression  · Unusual sadness, discouragement and loneliness  · Talk of wanting to die  · Neglect of personal  appearance   · Rebelliousness- reckless behavior  · Withdrawal from people/activities they love  · Confusion- inability to concentrate     If you or a loved one observes any of these behaviors or has concerns about self-harm, here's what you can do:  · Talk about it- your feelings and reasons for harming yourself  · Remove any means that you might use to hurt yourself (examples: pills, rope, extension cords, firearm)  · Get professional help from the community (Mental Health, Substance Abuse, psychological counseling)  · Do not be alone:Call your Safe Contact- someone whom you trust who will be there for you.  · Call your local CRISIS HOTLINE 628-1031 or 262-965-2273  · Call your local Children's Mobile Crisis Response Team [place] (942) 651-3006 or www.Knowthena  · Call the toll free National Suicide Prevention Hotlines   · National Suicide Prevention Lifeline 849-755-UDSJ (2340)  · National Hope Line Network 800-SUICIDE (501-3384)    I acknowledge receipt and understanding of these Home Care Instructions.

## 2020-06-02 NOTE — OR NURSING
0821 To PACU from ENDO via gurney s/p colonoscopy. Pt drowsy, respirations spontaneous and non-labored. Abdomen soft. Pt has no complaints.    0835 No changes. Pt's mother updated.    0850 No changes. Report to discharge SOPHIA Canales.

## 2020-06-02 NOTE — ANESTHESIA PREPROCEDURE EVALUATION
Relevant Problems   PULMONARY   (+) Asthma       Physical Exam    Airway   Mallampati: II  TM distance: >3 FB  Neck ROM: full       Cardiovascular - normal exam  Rhythm: regular  Rate: normal  (-) murmur     Dental - normal exam           Pulmonary - normal exam  Breath sounds clear to auscultation     Abdominal   (+) obese     Neurological - normal exam                 Anesthesia Plan    ASA 3   ASA physical status 3 criteria: morbid obesity - BMI greater than or equal to 40    Plan - general       Airway plan will be ETT        Induction: intravenous    Postoperative Plan: Postoperative administration of opioids is intended.    Pertinent diagnostic labs and testing reviewed    Informed Consent:    Anesthetic plan and risks discussed with patient.    Use of blood products discussed with: patient whom consented to blood products.

## 2020-06-02 NOTE — ANESTHESIA POSTPROCEDURE EVALUATION
Patient: Kelli Doherty    Procedure Summary     Date:  06/02/20 Room / Location:   ENDOSCOPIC ULTRASOUND ROOM / SURGERY [facility]    Anesthesia Start:  0740 Anesthesia Stop:  0823    Procedure:  COLONOSCOPY Diagnosis:       Diarrhea      (DIARRHEA)    Surgeon:  Marck Mcconnell M.D. Responsible Provider:  Tito Hendricks M.D.    Anesthesia Type:  general ASA Status:  3          Final Anesthesia Type: general  Last vitals  BP   Blood Pressure: 147/75    Temp   36 °C (96.8 °F)    Pulse   Pulse: 69   Resp   18    SpO2   93 %      Anesthesia Post Evaluation    Patient location during evaluation: PACU  Patient participation: complete - patient participated  Level of consciousness: awake and alert  Pain score: 0    Airway patency: patent  Anesthetic complications: no  Cardiovascular status: hemodynamically stable  Respiratory status: acceptable  Hydration status: euvolemic    PONV: none           Nurse Pain Score: 0 (NPRS)

## 2020-06-10 LAB — MISCELLANEOUS LAB RESULT MISCLAB: NORMAL

## 2020-06-25 ENCOUNTER — OFFICE VISIT (OUTPATIENT)
Dept: PULMONOLOGY | Facility: HOSPICE | Age: 30
End: 2020-06-25
Payer: COMMERCIAL

## 2020-06-25 ENCOUNTER — NON-PROVIDER VISIT (OUTPATIENT)
Dept: PULMONOLOGY | Facility: HOSPICE | Age: 30
End: 2020-06-25
Attending: INTERNAL MEDICINE
Payer: COMMERCIAL

## 2020-06-25 VITALS
HEART RATE: 69 BPM | HEIGHT: 59 IN | BODY MASS INDEX: 49.39 KG/M2 | SYSTOLIC BLOOD PRESSURE: 130 MMHG | RESPIRATION RATE: 16 BRPM | DIASTOLIC BLOOD PRESSURE: 60 MMHG | WEIGHT: 245 LBS | OXYGEN SATURATION: 98 %

## 2020-06-25 VITALS — HEIGHT: 59 IN | WEIGHT: 245 LBS | BODY MASS INDEX: 49.39 KG/M2

## 2020-06-25 DIAGNOSIS — J45.30 MILD PERSISTENT ASTHMA, UNSPECIFIED WHETHER COMPLICATED: ICD-10-CM

## 2020-06-25 DIAGNOSIS — Z87.891 FORMER SMOKER: ICD-10-CM

## 2020-06-25 PROCEDURE — 99213 OFFICE O/P EST LOW 20 MIN: CPT | Mod: 25 | Performed by: NURSE PRACTITIONER

## 2020-06-25 PROCEDURE — 94726 PLETHYSMOGRAPHY LUNG VOLUMES: CPT | Performed by: INTERNAL MEDICINE

## 2020-06-25 PROCEDURE — 94729 DIFFUSING CAPACITY: CPT | Performed by: INTERNAL MEDICINE

## 2020-06-25 PROCEDURE — 94060 EVALUATION OF WHEEZING: CPT | Performed by: INTERNAL MEDICINE

## 2020-06-25 RX ORDER — BUDESONIDE AND FORMOTEROL FUMARATE DIHYDRATE 160; 4.5 UG/1; UG/1
2 AEROSOL RESPIRATORY (INHALATION) 2 TIMES DAILY
Qty: 1 INHALER | Refills: 11 | Status: SHIPPED | OUTPATIENT
Start: 2020-06-25

## 2020-06-25 ASSESSMENT — PULMONARY FUNCTION TESTS
FEV1/FVC_PERCENT_PREDICTED: 99
FEV1_LLN: 2.24
FVC: 2.97
FVC_LLN: 2.63
FEV1/FVC_PERCENT_PREDICTED: 100
FEV1/FVC_PERCENT_LLN: 71
FEV1/FVC_PERCENT_PREDICTED: 99
FEV1/FVC: 85
FEV1_PREDICTED: 2.68
FEV1: 2.54
FEV1/FVC: 85
FEV1/FVC_PERCENT_PREDICTED: 100
FEV1/FVC_PREDICTED: 85
FVC_PREDICTED: 3.15
FEV1/FVC_PERCENT_PREDICTED: 85
FEV1_PERCENT_PREDICTED: 94
FEV1/FVC_PERCENT_CHANGE: 0
FEV1/FVC: 85.19
FVC_PERCENT_PREDICTED: 94
FEV1_PERCENT_CHANGE: 0
FEV1_PERCENT_CHANGE: 0
FVC: 2.99
FEV1: 2.53
FEV1/FVC: 85
FEV1_PERCENT_PREDICTED: 94
FVC_PERCENT_PREDICTED: 94

## 2020-06-25 ASSESSMENT — FIBROSIS 4 INDEX
FIB4 SCORE: 0.5
FIB4 SCORE: 0.5

## 2020-06-25 NOTE — PROGRESS NOTES
"Chief Complaint   Patient presents with   • Follow-Up     Last Seen 3/18/2020   • Results     PFT        HPI:  Kelli Doherty is a 30 y.o. year old female here today for follow-up on mid persistent asthma with hx of \"coughing fits\".  Last OV 3/18/2020 with Dr. Garcia.    Former smoker, quit  with 1.25PYH.  PFT 2020 indicates FVC 2.99 L or 94%, FEV1 2.54 L or 94%, FEV1 suggestive ratio 85, TLC 92% with a DLCO 137% predicted.  No significant bronchodilator response.  Patient is currently using Symbicort 160/4.5 mcg, 2 puffs twice daily with spacer.  She uses albuterol HFA inhaler as needed and mainly with coughing fit.  She notes not having one since March.  She overall feels her breathing status is stable and has no significant complaints.    She notes her son to have ongoing asthma issues since birth and would like to request a pulmonary evaluation for him as well.  I gave her information for pediatric pulmonology.    ROS: As per HPI and otherwise negative if not stated.    Past Medical History:   Diagnosis Date   • Asthma    • Back pain    • Bronchitis    • Chickenpox    • Cough    • Depression    • Influenza    • Wheezing        Past Surgical History:   Procedure Laterality Date   • PB COLONOSCOPY,DIAGNOSTIC  2020    Procedure: COLONOSCOPY;  Surgeon: Marck Mcconnell M.D.;  Location: SURGERY [facility];  Service: Gastroenterology   • OTHER ORTHOPEDIC SURGERY      Ankle   • OTHER ABDOMINAL SURGERY      galbladder removal   • CHOLECYSTECTOMY     • GYN SURGERY          • PRIMARY C SECTION         Family History   Problem Relation Age of Onset   • Thyroid Mother    • Diabetes Father    • Thyroid Brother        Social History     Socioeconomic History   • Marital status: Single     Spouse name: Not on file   • Number of children: Not on file   • Years of education: Not on file   • Highest education level: Not on file   Occupational History   • Not on file   Social Needs   • " Financial resource strain: Not on file   • Food insecurity     Worry: Not on file     Inability: Not on file   • Transportation needs     Medical: Not on file     Non-medical: Not on file   Tobacco Use   • Smoking status: Former Smoker     Packs/day: 0.50     Types: Cigarettes     Last attempt to quit: 3/2/2015     Years since quittin.3   • Smokeless tobacco: Never Used   Substance and Sexual Activity   • Alcohol use: No   • Drug use: No   • Sexual activity: Not on file   Lifestyle   • Physical activity     Days per week: Not on file     Minutes per session: Not on file   • Stress: Not on file   Relationships   • Social connections     Talks on phone: Not on file     Gets together: Not on file     Attends Amish service: Not on file     Active member of club or organization: Not on file     Attends meetings of clubs or organizations: Not on file     Relationship status: Not on file   • Intimate partner violence     Fear of current or ex partner: Not on file     Emotionally abused: Not on file     Physically abused: Not on file     Forced sexual activity: Not on file   Other Topics Concern   • Not on file   Social History Narrative   • Not on file       Allergies as of 2020 - Reviewed 2020   Allergen Reaction Noted   • Aleve [naproxen sodium] Rash 2010   • Betadine [povidone iodine] Rash 2019   • Latex Rash 2020   • Sulfamethoxazole w-trimethoprim Vomiting 2019        Vitals:  @Vital signs for this encounter:    Current medications as of today   Current Outpatient Medications   Medication Sig Dispense Refill   • budesonide-formoterol (SYMBICORT) 160-4.5 MCG/ACT Aerosol Inhale 2 Puffs by mouth 2 Times a Day. Use with spacer.  Rinse mouth after each use. 1 Inhaler 11   • ALPRAZolam (XANAX) 0.5 MG Tab Take 0.5 mg by mouth 3 times a day as needed for Sleep or Anxiety.     • traZODone (DESYREL) 50 MG Tab Take 50 mg by mouth every evening.     • Elagolix Sodium (ORILISSA) 200 MG  Tab Take 200 mg by mouth 2 Times a Day.     • albuterol 108 (90 Base) MCG/ACT Aero Soln inhalation aerosol Inhale 2 Puffs by mouth every 6 hours as needed for Shortness of Breath. 8.5 g 0     No current facility-administered medications for this visit.          Physical Exam:   Gen:           Alert and oriented, No apparent distress. Mood and affect appropriate, normal interaction with examiner.  Eyes:          PERRL, EOM intact, sclere white, conjunctive moist.  Ears:          Not examined.   Hearing:     Grossly intact.  Nose:          Normal, no lesions or deformities.  Dentition:    Good dentition.  Oropharynx:   mask  Mallampati Classification: mask  Neck:        Supple, trachea midline, no masses.  Respiratory Effort: No intercostal retractions or use of accessory muscles.   Lung Auscultation:      Clear to auscultation bilaterally; no rales, rhonchi or wheezing.  CV:            Regular rate and rhythm. No murmurs, rubs or gallops.  Abd:           Not examined.   Lymphadenopathy: Not examined.  Gait and Station: Normal.  Digits and Nails: No clubbing, cyanosis, petechiae, or nodes.   Cranial Nerves: II-XII grossly intact.  Skin:        No rashes, lesions or ulcers noted.               Ext:           No cyanosis or edema.      Assessment:  1. Mild persistent asthma, unspecified whether complicated  Spirometry   2. Former smoker     3. BMI 45.0-49.9, adult (HCC)  Height And Weight       Immunizations:    Flu:recommend  Pneumovax 23:not due  Prevnar 13:not due    Plan:  1.  Asthma is clinically stable.  Patient will continue with current bronchodilators.  Rx with coupon to be mailed to patient.  2.  Discussed respiratory hygiene.  3.  Encourage weight loss through diet and exercise.  4.  Follow-up primary care for health concerns.  5.  Follow-up in 1 year with spirometry, sooner if needed.    Please note that this dictation was created using voice recognition software. I have made every reasonable attempt to  correct obvious errors, but it is possible there are errors of grammar and possibly content that I did not discover before finalizing the note.

## 2020-06-25 NOTE — PROCEDURES
Technician: AMY Romero    Technician Comment:  Good patient effort & cooperation.  The results of this test meet the ATS/ERS standards for acceptability & reproducibility.  Test was performed on the [employer] Body Plethysmograph-Elite DX system.  Predicted values were GLI-2012 for spirometry, GLI-2017 for DLCO, ITS for Lung Volumes.  The DLCO was uncorrected for Hgb.  A bronchodilator of Ventolin HFA -2puffs via spacer administered.  DLCO performed during dilation period.    Interpretation:  1.  Baseline spirometry shows normal airflows.  2.  There is no significant bronchodilator response.  3.  Lung volumes are within normal limits other than reduced ERV consistent with obesity.  4.  Diffusion capacity is elevated at 137% predicted.  Normal pulmonary function testing with normal flow volume loop.  This does not rule out reactive airways disease.  Suggest clinical correlation.

## 2020-12-16 ENCOUNTER — HOSPITAL ENCOUNTER (OUTPATIENT)
Dept: LAB | Facility: MEDICAL CENTER | Age: 30
End: 2020-12-16
Attending: OBSTETRICS & GYNECOLOGY
Payer: COMMERCIAL

## 2020-12-16 ENCOUNTER — HOSPITAL ENCOUNTER (OUTPATIENT)
Dept: LAB | Facility: MEDICAL CENTER | Age: 30
End: 2020-12-16
Attending: PHYSICIAN ASSISTANT
Payer: COMMERCIAL

## 2020-12-16 LAB
25(OH)D3 SERPL-MCNC: 28 NG/ML (ref 30–100)
B-HCG SERPL-ACNC: <1 MIU/ML (ref 0–5)
EST. AVERAGE GLUCOSE BLD GHB EST-MCNC: 105 MG/DL
ESTRADIOL SERPL-MCNC: 149 PG/ML
FSH SERPL-ACNC: 3 MIU/ML
HBA1C MFR BLD: 5.3 % (ref 0–5.6)
PROGEST SERPL-MCNC: 7.61 NG/ML
T3FREE SERPL-MCNC: 3.39 PG/ML (ref 2–4.4)
T4 FREE SERPL-MCNC: 1.12 NG/DL (ref 0.93–1.7)
TSH SERPL DL<=0.005 MIU/L-ACNC: 2.06 UIU/ML (ref 0.38–5.33)

## 2020-12-16 PROCEDURE — 84481 FREE ASSAY (FT-3): CPT

## 2020-12-16 PROCEDURE — 36415 COLL VENOUS BLD VENIPUNCTURE: CPT

## 2020-12-16 PROCEDURE — 84443 ASSAY THYROID STIM HORMONE: CPT

## 2020-12-16 PROCEDURE — 83036 HEMOGLOBIN GLYCOSYLATED A1C: CPT

## 2020-12-16 PROCEDURE — 84702 CHORIONIC GONADOTROPIN TEST: CPT

## 2020-12-16 PROCEDURE — 84144 ASSAY OF PROGESTERONE: CPT

## 2020-12-16 PROCEDURE — 83001 ASSAY OF GONADOTROPIN (FSH): CPT

## 2020-12-16 PROCEDURE — 82670 ASSAY OF TOTAL ESTRADIOL: CPT

## 2020-12-16 PROCEDURE — 82306 VITAMIN D 25 HYDROXY: CPT

## 2020-12-16 PROCEDURE — 84439 ASSAY OF FREE THYROXINE: CPT

## 2021-02-11 ENCOUNTER — HOSPITAL ENCOUNTER (OUTPATIENT)
Dept: LAB | Facility: MEDICAL CENTER | Age: 31
End: 2021-02-11
Attending: PHYSICIAN ASSISTANT
Payer: COMMERCIAL

## 2021-02-12 ENCOUNTER — HOSPITAL ENCOUNTER (OUTPATIENT)
Dept: LAB | Facility: MEDICAL CENTER | Age: 31
End: 2021-02-12
Attending: PHYSICIAN ASSISTANT
Payer: COMMERCIAL

## 2021-02-12 LAB — B-HCG SERPL-ACNC: 3.8 MIU/ML (ref 0–5)

## 2021-02-12 PROCEDURE — 36415 COLL VENOUS BLD VENIPUNCTURE: CPT

## 2021-02-12 PROCEDURE — 84702 CHORIONIC GONADOTROPIN TEST: CPT

## 2021-03-10 ENCOUNTER — APPOINTMENT (OUTPATIENT)
Dept: RADIOLOGY | Facility: IMAGING CENTER | Age: 31
End: 2021-03-10
Attending: STUDENT IN AN ORGANIZED HEALTH CARE EDUCATION/TRAINING PROGRAM
Payer: COMMERCIAL

## 2021-03-10 ENCOUNTER — OFFICE VISIT (OUTPATIENT)
Dept: URGENT CARE | Facility: CLINIC | Age: 31
End: 2021-03-10
Payer: COMMERCIAL

## 2021-03-10 VITALS
OXYGEN SATURATION: 96 % | SYSTOLIC BLOOD PRESSURE: 114 MMHG | HEIGHT: 59 IN | BODY MASS INDEX: 41.12 KG/M2 | WEIGHT: 204 LBS | TEMPERATURE: 97 F | HEART RATE: 86 BPM | RESPIRATION RATE: 16 BRPM | DIASTOLIC BLOOD PRESSURE: 80 MMHG

## 2021-03-10 DIAGNOSIS — M25.572 ACUTE LEFT ANKLE PAIN: ICD-10-CM

## 2021-03-10 DIAGNOSIS — M25.571 ACUTE RIGHT ANKLE PAIN: ICD-10-CM

## 2021-03-10 PROCEDURE — 73610 X-RAY EXAM OF ANKLE: CPT | Mod: TC,LT | Performed by: STUDENT IN AN ORGANIZED HEALTH CARE EDUCATION/TRAINING PROGRAM

## 2021-03-10 PROCEDURE — 99204 OFFICE O/P NEW MOD 45 MIN: CPT | Performed by: STUDENT IN AN ORGANIZED HEALTH CARE EDUCATION/TRAINING PROGRAM

## 2021-03-10 PROCEDURE — 73610 X-RAY EXAM OF ANKLE: CPT | Mod: TC,RT | Performed by: STUDENT IN AN ORGANIZED HEALTH CARE EDUCATION/TRAINING PROGRAM

## 2021-03-10 RX ORDER — MELOXICAM 15 MG/1
15 TABLET ORAL DAILY
Qty: 30 TABLET | Refills: 0 | Status: SHIPPED | OUTPATIENT
Start: 2021-03-10

## 2021-03-10 RX ORDER — TIZANIDINE 4 MG/1
4 TABLET ORAL EVERY 6 HOURS PRN
COMMUNITY

## 2021-03-10 ASSESSMENT — FIBROSIS 4 INDEX: FIB4 SCORE: 0.5

## 2021-03-11 NOTE — PROGRESS NOTES
"Subjective:   CHIEF COMPLAINT  Chief Complaint   Patient presents with   • Ankle Injury     x yesterday Lt. ankle and today Rt. ankle injury       HPI  Kelli Doherty is a 30 y.o. female who presents bilateral ankle pain.  Yesterday she suffered an inversion twisting injury to her left ankle.  And today she also suffered a inversion injury to her right ankle.  Now she is complaining of bilateral ankle pain.  Symptoms are worse with weightbearing but she is able to ambulate without assistance.  She has tried ibuprofen 800 mg and Tylenol 1000 mg which have not helped.  No additional modifying factors.  She denies instability in either ankle.  There has been mild swelling but no bruising.  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    PAST MEDICAL HISTORY   has a past medical history of Asthma, Back pain, Bronchitis, Chickenpox, Cough, Depression, Influenza, and Wheezing.    SURGICAL HISTORY   has a past surgical history that includes gyn surgery; other abdominal surgery (2012); other orthopedic surgery (2014); primary c section; cholecystectomy; and colonoscopy,diagnostic (6/2/2020).    ALLERGIES  Allergies   Allergen Reactions   • Aleve [Naproxen Sodium] Rash     \"rash\"   • Betadine [Povidone Iodine] Rash     \"rash, hives\"   • Latex Rash     Rash   • Sulfamethoxazole W-Trimethoprim Vomiting     \"vomiting\"       CURRENT MEDICATIONS  Home Medications     Reviewed by Omega Simpson D.O. (Physician) on 03/10/21 at 1909  Med List Status: <None>   Medication Last Dose Status   albuterol 108 (90 Base) MCG/ACT Aero Soln inhalation aerosol  Active   ALPRAZolam (XANAX) 0.5 MG Tab Taking Active   budesonide-formoterol (SYMBICORT) 160-4.5 MCG/ACT Aerosol  Active   Elagolix Sodium (ORILISSA) 200 MG Tab  Active   meloxicam (MOBIC) 15 MG tablet  Active " "  tizanidine (ZANAFLEX) 4 MG Tab Taking Active   traZODone (DESYREL) 50 MG Tab Taking Active                SOCIAL HISTORY  Social History     Tobacco Use   • Smoking status: Former Smoker     Packs/day: 0.25     Years: 5.00     Pack years: 1.25     Types: Cigarettes     Start date: 2010     Quit date: 3/2/2016     Years since quittin.0   • Smokeless tobacco: Never Used   Substance and Sexual Activity   • Alcohol use: No   • Drug use: No   • Sexual activity: Not on file       FAMILY HISTORY  Family History   Problem Relation Age of Onset   • Thyroid Mother    • Diabetes Father    • Thyroid Brother           Objective:   PHYSICAL EXAM  VITAL SIGNS: /80 (BP Location: Right arm, Patient Position: Sitting, BP Cuff Size: Large adult)   Pulse 86   Temp 36.1 °C (97 °F) (Temporal)   Resp 16   Ht 1.499 m (4' 11\")   Wt 92.5 kg (204 lb) Comment: per pt  SpO2 96%   BMI 41.20 kg/m²     Gen: no acute distress, normal voice  Skin: dry, intact, moist mucosal membranes  Lungs: CTAB w/ symmetric expansion  CV: RRR w/o murmurs or clicks  MSK: Bilateral ankles: Mild edema anterior and inferior to lateral malleoli bilaterally.  No erythema or ecchymosis.  45 degrees plantar flexion.  20 degrees dorsiflexion.  5 degrees eversion.  TTP along the posterior margins of the lateral malleoli b/l  and globally along the distal inferior margins of the lateral and medial malleoli bilaterally.  Negative anterior drawer tests.  Negative talar tilt test.  Negative squeeze test.  Negative Perry test.  Psych: normal affect, normal judgement, alert, awake        RADIOLOGY RESULTS   DX-ANKLE 3+ VIEWS LEFT    Result Date: 3/10/2021  3/10/2021 5:53 PM HISTORY/REASON FOR EXAM:  Pain/Deformity Following Trauma; TTP lateral malleolus.. TECHNIQUE/EXAM DESCRIPTION AND NUMBER OF VIEWS:  3 views of the LEFT ankle. COMPARISON: None. FINDINGS: The alignment and mineralization are normal. No fracture or dislocation are appreciated. The ankle " mortise appears intact.     No radiographic evidence of acute traumatic bone injury.    DX-ANKLE 3+ VIEWS RIGHT    Result Date: 3/10/2021  3/10/2021 5:54 PM HISTORY/REASON FOR EXAM:  Pain TECHNIQUE/EXAM DESCRIPTION AND NUMBER OF VIEWS:  3 views of the RIGHT ankle. COMPARISON: None FINDINGS: There are postsurgical changes of the distal fibula. Small calcific densities adjacent to the malleoli are of indeterminate age. There is mild soft tissue swelling overlying the lateral malleolus. Talar dome is maintained. There is an accessory ossicle adjacent to the navicular. Ankle mortise is maintained. There is mild spurring of the calcaneus at the insertion of the plantar fascia.     Small calcific densities adjacent to the malleoli are of indeterminate age but may be chronic. Postsurgical changes of the distal fibula. Soft tissue swelling overlying the distal fibula. Spurring of the calcaneus at the insertion of the plantar fascia.           Assessment/Plan:     1. Acute left ankle pain  DX-ANKLE 3+ VIEWS LEFT    DX-ANKLE 3+ VIEWS RIGHT    meloxicam (MOBIC) 15 MG tablet   2. Acute right ankle pain  DX-ANKLE 3+ VIEWS LEFT    DX-ANKLE 3+ VIEWS RIGHT    meloxicam (MOBIC) 15 MG tablet   2/2 ankle sprain.  X-rays were ordered and personally reviewed and discussed with the patient which were negative for any acute osseous abnormalities.  No instability on exam.  -Ice, compression elevation  -Weightbearing as tolerated  -Instructed to perform home exercises to strengthen her ankles  -Rx sent for meloxicam    Differential diagnosis, natural history, supportive care, and indications for immediate follow-up discussed. All questions answered. Patient agrees with the plan of care.    Follow-up as needed if symptoms worsen or fail to improve to PCP, Urgent care or Emergency Room.    .>30 minutes was spent caring for this patient on the day of the encounter which included face-to-face time, discussing the diagnosis, reviewing the  x-rays, medical management, follow-up, and completion of the chart.    Please note that this dictation was created using voice recognition software. I have made a reasonable attempt to correct obvious errors, but I expect that there are errors of grammar and possibly content that I did not discover before finalizing the note.

## 2021-03-12 NOTE — PROGRESS NOTES
"Subjective:   CHIEF COMPLAINT  Chief Complaint   Patient presents with   • Ankle Injury     x yesterday Lt. ankle and today Rt. ankle injury       HPI  Kelli Doherty is a 30 y.o. female who presents bilateral ankle pain.  Yesterday she suffered an inversion twisting injury to her left ankle.  And today she also suffered a inversion injury to her right ankle.  Now she is complaining of bilateral ankle pain.  Symptoms are worse with weightbearing but she is able to ambulate without assistance.  She has tried ibuprofen 800 mg and Tylenol 1000 mg which have not helped.  No additional modifying factors.  She denies instability in either ankle.  There has been mild swelling but no bruising.  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    PAST MEDICAL HISTORY   has a past medical history of Asthma, Back pain, Bronchitis, Chickenpox, Cough, Depression, Influenza, and Wheezing.    SURGICAL HISTORY   has a past surgical history that includes gyn surgery; other abdominal surgery (2012); other orthopedic surgery (2014); primary c section; cholecystectomy; and colonoscopy,diagnostic (6/2/2020).    ALLERGIES  Allergies   Allergen Reactions   • Aleve [Naproxen Sodium] Rash     \"rash\"   • Betadine [Povidone Iodine] Rash     \"rash, hives\"   • Latex Rash     Rash   • Sulfamethoxazole W-Trimethoprim Vomiting     \"vomiting\"       CURRENT MEDICATIONS  Home Medications     Reviewed by Omega Simpson D.O. (Physician) on 03/11/21 at 1735  Med List Status: <None>   Medication Last Dose Status   albuterol 108 (90 Base) MCG/ACT Aero Soln inhalation aerosol  Active   ALPRAZolam (XANAX) 0.5 MG Tab Taking Active   budesonide-formoterol (SYMBICORT) 160-4.5 MCG/ACT Aerosol  Active   Elagolix Sodium (ORILISSA) 200 MG Tab  Active   meloxicam (MOBIC) 15 MG tablet  Active " "  tizanidine (ZANAFLEX) 4 MG Tab Taking Active   traZODone (DESYREL) 50 MG Tab Taking Active                SOCIAL HISTORY  Social History     Tobacco Use   • Smoking status: Former Smoker     Packs/day: 0.25     Years: 5.00     Pack years: 1.25     Types: Cigarettes     Start date: 2010     Quit date: 3/2/2016     Years since quittin.0   • Smokeless tobacco: Never Used   Substance and Sexual Activity   • Alcohol use: No   • Drug use: No   • Sexual activity: Not on file       FAMILY HISTORY  Family History   Problem Relation Age of Onset   • Thyroid Mother    • Diabetes Father    • Thyroid Brother           Objective:   PHYSICAL EXAM  VITAL SIGNS: /80 (BP Location: Right arm, Patient Position: Sitting, BP Cuff Size: Large adult)   Pulse 86   Temp 36.1 °C (97 °F) (Temporal)   Resp 16   Ht 1.499 m (4' 11\")   Wt 92.5 kg (204 lb) Comment: per pt  SpO2 96%   BMI 41.20 kg/m²     Gen: no acute distress, normal voice  Skin: dry, intact, moist mucosal membranes  Lungs: CTAB w/ symmetric expansion  CV: RRR w/o murmurs or clicks  MSK: Bilateral ankles: Mild edema anterior and inferior to lateral malleoli bilaterally.  No erythema or ecchymosis.  45 degrees plantar flexion.  20 degrees dorsiflexion.  5 degrees eversion.  TTP along the posterior margins of the lateral malleoli b/l  and globally along the distal inferior margins of the lateral and medial malleoli bilaterally.  Negative anterior drawer tests.  Negative talar tilt test.  Negative squeeze test.  Negative Perry test.  Psych: normal affect, normal judgement, alert, awake        RADIOLOGY RESULTS   DX-ANKLE 3+ VIEWS LEFT    Result Date: 3/10/2021  3/10/2021 5:53 PM HISTORY/REASON FOR EXAM:  Pain/Deformity Following Trauma; TTP lateral malleolus.. TECHNIQUE/EXAM DESCRIPTION AND NUMBER OF VIEWS:  3 views of the LEFT ankle. COMPARISON: None. FINDINGS: The alignment and mineralization are normal. No fracture or dislocation are appreciated. The ankle " mortise appears intact.     No radiographic evidence of acute traumatic bone injury.    DX-ANKLE 3+ VIEWS RIGHT    Result Date: 3/10/2021  3/10/2021 5:54 PM HISTORY/REASON FOR EXAM:  Pain TECHNIQUE/EXAM DESCRIPTION AND NUMBER OF VIEWS:  3 views of the RIGHT ankle. COMPARISON: None FINDINGS: There are postsurgical changes of the distal fibula. Small calcific densities adjacent to the malleoli are of indeterminate age. There is mild soft tissue swelling overlying the lateral malleolus. Talar dome is maintained. There is an accessory ossicle adjacent to the navicular. Ankle mortise is maintained. There is mild spurring of the calcaneus at the insertion of the plantar fascia.     Small calcific densities adjacent to the malleoli are of indeterminate age but may be chronic. Postsurgical changes of the distal fibula. Soft tissue swelling overlying the distal fibula. Spurring of the calcaneus at the insertion of the plantar fascia.           Assessment/Plan:     1. Acute left ankle pain  DX-ANKLE 3+ VIEWS LEFT    DX-ANKLE 3+ VIEWS RIGHT    meloxicam (MOBIC) 15 MG tablet   2. Acute right ankle pain  DX-ANKLE 3+ VIEWS LEFT    DX-ANKLE 3+ VIEWS RIGHT    meloxicam (MOBIC) 15 MG tablet   2/2 ankle sprain.  X-rays were ordered and personally reviewed and discussed with the patient which were negative for any acute osseous abnormalities.  No instability on exam.  -Ice, compression elevation  -Weightbearing as tolerated  -Instructed to perform home exercises to strengthen her ankles  -Rx sent for meloxicam    Differential diagnosis, natural history, supportive care, and indications for immediate follow-up discussed. All questions answered. Patient agrees with the plan of care.    Follow-up as needed if symptoms worsen or fail to improve to PCP, Urgent care or Emergency Room.    .>30 minutes was spent caring for this patient on the day of the encounter which included face-to-face time, discussing the diagnosis, reviewing the  x-rays, medical management, follow-up, and completion of the chart.    Please note that this dictation was created using voice recognition software. I have made a reasonable attempt to correct obvious errors, but I expect that there are errors of grammar and possibly content that I did not discover before finalizing the note.

## 2021-03-22 ENCOUNTER — HOSPITAL ENCOUNTER (OUTPATIENT)
Dept: RADIOLOGY | Facility: MEDICAL CENTER | Age: 31
End: 2021-03-22
Attending: PHYSICIAN ASSISTANT
Payer: COMMERCIAL

## 2021-03-22 DIAGNOSIS — M25.371 UNSTABLE RIGHT ANKLE: ICD-10-CM

## 2021-03-22 DIAGNOSIS — M25.571 RIGHT ANKLE PAIN, UNSPECIFIED CHRONICITY: ICD-10-CM

## 2021-03-22 PROCEDURE — 73721 MRI JNT OF LWR EXTRE W/O DYE: CPT | Mod: RT

## 2021-03-26 ENCOUNTER — HOSPITAL ENCOUNTER (OUTPATIENT)
Dept: LAB | Facility: MEDICAL CENTER | Age: 31
End: 2021-03-26
Attending: ANESTHESIOLOGY
Payer: COMMERCIAL

## 2021-03-26 LAB
COVID ORDER STATUS COVID19: NORMAL
SARS-COV-2 RNA RESP QL NAA+PROBE: NOTDETECTED
SPECIMEN SOURCE: NORMAL

## 2021-03-26 PROCEDURE — U0005 INFEC AGEN DETEC AMPLI PROBE: HCPCS

## 2021-03-26 PROCEDURE — U0003 INFECTIOUS AGENT DETECTION BY NUCLEIC ACID (DNA OR RNA); SEVERE ACUTE RESPIRATORY SYNDROME CORONAVIRUS 2 (SARS-COV-2) (CORONAVIRUS DISEASE [COVID-19]), AMPLIFIED PROBE TECHNIQUE, MAKING USE OF HIGH THROUGHPUT TECHNOLOGIES AS DESCRIBED BY CMS-2020-01-R: HCPCS

## 2021-03-26 PROCEDURE — C9803 HOPD COVID-19 SPEC COLLECT: HCPCS

## 2022-05-27 ENCOUNTER — HOSPITAL ENCOUNTER (OUTPATIENT)
Dept: RADIOLOGY | Facility: MEDICAL CENTER | Age: 32
End: 2022-05-27
Attending: STUDENT IN AN ORGANIZED HEALTH CARE EDUCATION/TRAINING PROGRAM
Payer: MEDICAID

## 2022-05-27 DIAGNOSIS — M54.2 CERVICALGIA: ICD-10-CM
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
## 2022-05-27 DIAGNOSIS — M54.9 DORSALGIA: ICD-10-CM

## 2022-05-27 PROCEDURE — 72070 X-RAY EXAM THORAC SPINE 2VWS: CPT

## 2022-05-27 PROCEDURE — 72100 X-RAY EXAM L-S SPINE 2/3 VWS: CPT

## 2022-05-27 PROCEDURE — 72040 X-RAY EXAM NECK SPINE 2-3 VW: CPT

## 2024-02-06 ENCOUNTER — OFFICE VISIT (OUTPATIENT)
Dept: URGENT CARE | Facility: PHYSICIAN GROUP | Age: 34
End: 2024-02-06
Payer: MEDICAID

## 2024-02-06 ENCOUNTER — APPOINTMENT (OUTPATIENT)
Dept: RADIOLOGY | Facility: IMAGING CENTER | Age: 34
End: 2024-02-06
Attending: FAMILY MEDICINE
Payer: MEDICAID

## 2024-02-06 VITALS
TEMPERATURE: 97.2 F | HEART RATE: 101 BPM | WEIGHT: 256 LBS | SYSTOLIC BLOOD PRESSURE: 130 MMHG | HEIGHT: 60 IN | OXYGEN SATURATION: 99 % | RESPIRATION RATE: 16 BRPM | BODY MASS INDEX: 50.26 KG/M2 | DIASTOLIC BLOOD PRESSURE: 68 MMHG

## 2024-02-06 DIAGNOSIS — R00.0 TACHYCARDIA: ICD-10-CM

## 2024-02-06 DIAGNOSIS — M25.572 ACUTE LEFT ANKLE PAIN: ICD-10-CM

## 2024-02-06 PROCEDURE — 99214 OFFICE O/P EST MOD 30 MIN: CPT | Performed by: FAMILY MEDICINE

## 2024-02-06 PROCEDURE — 73610 X-RAY EXAM OF ANKLE: CPT | Mod: TC,FY,LT | Performed by: FAMILY MEDICINE

## 2024-02-06 PROCEDURE — 3075F SYST BP GE 130 - 139MM HG: CPT | Performed by: FAMILY MEDICINE

## 2024-02-06 PROCEDURE — 3078F DIAST BP <80 MM HG: CPT | Performed by: FAMILY MEDICINE

## 2024-02-06 RX ORDER — LEVOTHYROXINE SODIUM 0.07 MG/1
75 TABLET ORAL
COMMUNITY
Start: 2024-01-10

## 2024-02-06 NOTE — PROGRESS NOTES
Subjective:      33 y.o. female presents to urgent care for left ankle pain that started on Sunday.  She was unloading a truck in the snow when she slipped and twisted her ankle.  She did not fall.  She has had constant pain to her left ankle since then, its described as feeling sharp and burning, currently rated 4/10.  She is using Tylenol and ibuprofen with good relief in symptoms.    Her heart rate is elevated today in urgent care.  She denies any chest pain, palpitations, or shortness of breath.    She denies any other questions or concerns at this time.    Current problem list, medication, and past medical/surgical history were reviewed in Epic.    ROS  See HPI     Objective:      /68   Pulse (!) 101   Temp 36.2 °C (97.2 °F) (Temporal)   Resp 16   Ht 1.524 m (5')   Wt 116 kg (256 lb)   SpO2 99%   BMI 50.00 kg/m²     Physical Exam  Constitutional:       General: She is not in acute distress.     Appearance: She is not diaphoretic.   Cardiovascular:      Rate and Rhythm: Regular rhythm. Tachycardia present.      Pulses:           Dorsalis pedis pulses are 2+ on the left side.        Posterior tibial pulses are 2+ on the left side.      Heart sounds: Normal heart sounds.   Pulmonary:      Effort: Pulmonary effort is normal. No respiratory distress.      Breath sounds: Normal breath sounds.   Musculoskeletal:      Comments: Edema noted to the lateral aspect of her left foot.  She is tender to palpation of her lateral malleolus and midfoot some.  No pain to palpation of medial malleolus.  She is still able to plantar and dorsiflex against resistance.  Antalgic gait.   Neurological:      Mental Status: She is alert.   Psychiatric:         Mood and Affect: Affect normal.         Judgment: Judgment normal.       Assessment/Plan:     1. Acute left ankle pain  2. Tachycardia  Systemic symptoms seen through tachycardia.  This is asymptomatic.  X-ray showing no acute osseous abnormality.  Most consistent  with strain.  She was encouraged to use heat, Tylenol, and ibuprofen as needed for symptomatic relief.  - DX-ANKLE 3+ VIEWS LEFT; Future      Instructed to return to Urgent Care or nearest Emergency Department if symptoms fail to improve, for any change in condition, further concerns, or new concerning symptoms. Patient states understanding of the plan of care and discharge instructions.    Angie Diaz M.D.

## 2024-02-06 NOTE — LETTER
February 6, 2024    To Whom It May Concern:         This is confirmation that Kelli Doherty attended her scheduled appointment with Angie Diaz M.D. on 2/06/24. She may return to work tomorrow without any restrictions.          If you have any questions please do not hesitate to call me at the phone number listed below.    Sincerely,          Angie Diaz M.D.  [phone]

## 2025-01-12 ENCOUNTER — HOSPITAL ENCOUNTER (EMERGENCY)
Facility: MEDICAL CENTER | Age: 35
End: 2025-01-12
Attending: STUDENT IN AN ORGANIZED HEALTH CARE EDUCATION/TRAINING PROGRAM
Payer: MEDICAID

## 2025-01-12 VITALS
DIASTOLIC BLOOD PRESSURE: 86 MMHG | TEMPERATURE: 96.6 F | SYSTOLIC BLOOD PRESSURE: 131 MMHG | WEIGHT: 260.58 LBS | HEIGHT: 59 IN | BODY MASS INDEX: 52.53 KG/M2 | OXYGEN SATURATION: 94 % | HEART RATE: 91 BPM | RESPIRATION RATE: 20 BRPM

## 2025-01-12 DIAGNOSIS — R10.32 LEFT LOWER QUADRANT ABDOMINAL PAIN: ICD-10-CM

## 2025-01-12 DIAGNOSIS — Z3A.15 15 WEEKS GESTATION OF PREGNANCY: ICD-10-CM

## 2025-01-12 LAB
ALBUMIN SERPL BCP-MCNC: 3.5 G/DL (ref 3.2–4.9)
ALBUMIN/GLOB SERPL: 1 G/DL
ALP SERPL-CCNC: 80 U/L (ref 30–99)
ALT SERPL-CCNC: 35 U/L (ref 2–50)
ANION GAP SERPL CALC-SCNC: 13 MMOL/L (ref 7–16)
APPEARANCE UR: CLEAR
AST SERPL-CCNC: 24 U/L (ref 12–45)
BASOPHILS # BLD AUTO: 0.2 % (ref 0–1.8)
BASOPHILS # BLD: 0.02 K/UL (ref 0–0.12)
BILIRUB SERPL-MCNC: 0.2 MG/DL (ref 0.1–1.5)
BILIRUB UR QL STRIP.AUTO: NEGATIVE
BUN SERPL-MCNC: 8 MG/DL (ref 8–22)
CALCIUM ALBUM COR SERPL-MCNC: 9.4 MG/DL (ref 8.5–10.5)
CALCIUM SERPL-MCNC: 9 MG/DL (ref 8.4–10.2)
CHLORIDE SERPL-SCNC: 101 MMOL/L (ref 96–112)
CO2 SERPL-SCNC: 21 MMOL/L (ref 20–33)
COLOR UR: YELLOW
CREAT SERPL-MCNC: 0.48 MG/DL (ref 0.5–1.4)
EOSINOPHIL # BLD AUTO: 0.14 K/UL (ref 0–0.51)
EOSINOPHIL NFR BLD: 1.1 % (ref 0–6.9)
ERYTHROCYTE [DISTWIDTH] IN BLOOD BY AUTOMATED COUNT: 44.4 FL (ref 35.9–50)
GFR SERPLBLD CREATININE-BSD FMLA CKD-EPI: 125 ML/MIN/1.73 M 2
GLOBULIN SER CALC-MCNC: 3.5 G/DL (ref 1.9–3.5)
GLUCOSE SERPL-MCNC: 99 MG/DL (ref 65–99)
GLUCOSE UR STRIP.AUTO-MCNC: NEGATIVE MG/DL
HCT VFR BLD AUTO: 40.2 % (ref 37–47)
HGB BLD-MCNC: 13.2 G/DL (ref 12–16)
IMM GRANULOCYTES # BLD AUTO: 0.07 K/UL (ref 0–0.11)
IMM GRANULOCYTES NFR BLD AUTO: 0.5 % (ref 0–0.9)
KETONES UR STRIP.AUTO-MCNC: NEGATIVE MG/DL
LEUKOCYTE ESTERASE UR QL STRIP.AUTO: NEGATIVE
LYMPHOCYTES # BLD AUTO: 2.44 K/UL (ref 1–4.8)
LYMPHOCYTES NFR BLD: 19.1 % (ref 22–41)
MCH RBC QN AUTO: 26.4 PG (ref 27–33)
MCHC RBC AUTO-ENTMCNC: 32.8 G/DL (ref 32.2–35.5)
MCV RBC AUTO: 80.4 FL (ref 81.4–97.8)
MICRO URNS: NORMAL
MONOCYTES # BLD AUTO: 0.55 K/UL (ref 0–0.85)
MONOCYTES NFR BLD AUTO: 4.3 % (ref 0–13.4)
NEUTROPHILS # BLD AUTO: 9.58 K/UL (ref 1.82–7.42)
NEUTROPHILS NFR BLD: 74.8 % (ref 44–72)
NITRITE UR QL STRIP.AUTO: NEGATIVE
NRBC # BLD AUTO: 0 K/UL
NRBC BLD-RTO: 0 /100 WBC (ref 0–0.2)
PH UR STRIP.AUTO: 6 [PH] (ref 5–8)
PLATELET # BLD AUTO: 242 K/UL (ref 164–446)
PMV BLD AUTO: 12.1 FL (ref 9–12.9)
POTASSIUM SERPL-SCNC: 3.3 MMOL/L (ref 3.6–5.5)
PROT SERPL-MCNC: 7 G/DL (ref 6–8.2)
PROT UR QL STRIP: NEGATIVE MG/DL
RBC # BLD AUTO: 5 M/UL (ref 4.2–5.4)
RBC UR QL AUTO: NEGATIVE
SODIUM SERPL-SCNC: 135 MMOL/L (ref 135–145)
SP GR UR STRIP.AUTO: >=1.03
WBC # BLD AUTO: 12.8 K/UL (ref 4.8–10.8)

## 2025-01-12 PROCEDURE — 99284 EMERGENCY DEPT VISIT MOD MDM: CPT

## 2025-01-12 PROCEDURE — 700102 HCHG RX REV CODE 250 W/ 637 OVERRIDE(OP): Performed by: STUDENT IN AN ORGANIZED HEALTH CARE EDUCATION/TRAINING PROGRAM

## 2025-01-12 PROCEDURE — A9270 NON-COVERED ITEM OR SERVICE: HCPCS | Performed by: STUDENT IN AN ORGANIZED HEALTH CARE EDUCATION/TRAINING PROGRAM

## 2025-01-12 PROCEDURE — 81003 URINALYSIS AUTO W/O SCOPE: CPT

## 2025-01-12 PROCEDURE — 85025 COMPLETE CBC W/AUTO DIFF WBC: CPT

## 2025-01-12 PROCEDURE — 80053 COMPREHEN METABOLIC PANEL: CPT

## 2025-01-12 RX ORDER — ACETAMINOPHEN 500 MG
1000 TABLET ORAL ONCE
Status: COMPLETED | OUTPATIENT
Start: 2025-01-12 | End: 2025-01-12

## 2025-01-12 RX ADMIN — ACETAMINOPHEN 1000 MG: 500 TABLET ORAL at 05:55

## 2025-01-12 NOTE — ED TRIAGE NOTES
"Chief Complaint   Patient presents with    Pregnancy     Patient ambulates to ER with a chief complaint for lower abdominal pain, lower back pain, and a migraine that have started yesterday. Fears symptoms are related to her pregnancy.     /86   Pulse 90   Temp 35.9 °C (96.6 °F) (Temporal)   Resp 18   Ht 1.499 m (4' 11\")   Wt 118 kg (260 lb 9.3 oz)   SpO2 94%   BMI 52.63 kg/m²     "

## 2025-01-12 NOTE — ED PROVIDER NOTES
ED Provider Note    CHIEF COMPLAINT  Chief Complaint   Patient presents with    Pregnancy     Patient ambulates to ER with a chief complaint for lower abdominal pain, lower back pain, and a migraine that have started yesterday. Fears symptoms are related to her pregnancy.       EXTERNAL RECORDS REVIEWED  Outpatient Notes saw her family practitioner to 624 for evaluation of ankle pain.  X-ray looks fine at that time.    HPI/ROS  LIMITATION TO HISTORY   Select: : None      Kelli Doherty is a 34 y.o. female who presents to the emergency department for evaluation of lower abdominal pain.  She states that the pain started yesterday around 6:00 in the evening and has been waxing and waning.  She notes that it is in the left lower abdomen described as a cramping sensation that radiates to her low back intermittently.  She has not had something similar.  She states it feels somewhat like menstrual cramps.  She states is currently 2 out of 10 severity.  She notes that she is currently 15 weeks pregnant.  She is G8, .  She denies history of ectopic pregnancy.  She notes that she had a confirmatory IUP on ultrasound earlier in the pregnancy.  She denies any vaginal bleeding or discharge, pain with urination or change in urinary frequency, urinary or stool incontinence.  She denies any pain radiating down her legs, numbness or weakness in the legs.  She denies fever, chills, nausea, vomiting, diarrhea.  She notes that she is blood type AB+.    PAST MEDICAL HISTORY   has a past medical history of Asthma, Back pain, Bronchitis, Chickenpox, Cough, Depression, Influenza, and Wheezing.    SURGICAL HISTORY   has a past surgical history that includes gyn surgery; other abdominal surgery (); other orthopedic surgery (); primary c section; cholecystectomy; and colonoscopy,diagnostic (2020).    FAMILY HISTORY  Family History   Problem Relation Age of Onset    Thyroid Mother     Diabetes Father     Thyroid Brother   "      SOCIAL HISTORY  Social History     Tobacco Use    Smoking status: Former     Current packs/day: 0.00     Average packs/day: 0.3 packs/day for 6.2 years (1.5 ttl pk-yrs)     Types: Cigarettes     Start date: 2010     Quit date: 3/2/2016     Years since quittin.8    Smokeless tobacco: Never   Vaping Use    Vaping status: Never Used   Substance and Sexual Activity    Alcohol use: No    Drug use: No    Sexual activity: Not on file       CURRENT MEDICATIONS  Home Medications       Reviewed by Ashu Arndt R.N. (Registered Nurse) on 25 at 0523  Med List Status: Not Addressed     Medication Last Dose Status   albuterol 108 (90 Base) MCG/ACT Aero Soln inhalation aerosol  Active   budesonide-formoterol (SYMBICORT) 160-4.5 MCG/ACT Aerosol  Active   gabapentin (NEURONTIN) 300 MG Cap  Active   levothyroxine (SYNTHROID) 75 MCG Tab  Active   meloxicam (MOBIC) 15 MG tablet  Active   tizanidine (ZANAFLEX) 4 MG Tab  Active   traZODone (DESYREL) 50 MG Tab  Active                    ALLERGIES  Allergies   Allergen Reactions    Aleve [Naproxen Sodium] Rash     \"rash\"    Betadine [Povidone Iodine] Rash     \"rash, hives\"    Latex Rash     Rash    Sulfamethoxazole W-Trimethoprim Vomiting     \"vomiting\"       PHYSICAL EXAM  VITAL SIGNS: /86   Pulse 90   Temp 35.9 °C (96.6 °F) (Temporal)   Resp 18   Ht 1.499 m (4' 11\")   Wt 118 kg (260 lb 9.3 oz)   SpO2 94%   BMI 52.63 kg/m²    Constitutional: No acute distress  HEENT: Atraumatic, normocephalic, pupils are equal round reactive to light, nose normal, mouth shows moist mucous membranes  Neck: Supple, no JVD, no tracheal deviation  Cardiovascular: Regular rate and rhythm, no murmur, rub or gallop, 2+ pulses peripherally x4  Thorax & Lungs: No respiratory distress, no wheezes, rales or rhonchi, no chest wall tenderness.  GI: Soft, non-distended, non-tender, no rebound  Skin: Warm, dry, no acute rash or lesion  Musculoskeletal: Moving all extremities, no acute " deformity, no edema, no tenderness  Neurologic: A&Ox3, at baseline mentation  Psychiatric: Appropriate affect for situation at this time      EKG/LABS  Labs Reviewed   CBC WITH DIFFERENTIAL - Abnormal; Notable for the following components:       Result Value    WBC 12.8 (*)     MCV 80.4 (*)     MCH 26.4 (*)     Neutrophils-Polys 74.80 (*)     Lymphocytes 19.10 (*)     Neutrophils (Absolute) 9.58 (*)     All other components within normal limits   COMP METABOLIC PANEL - Abnormal; Notable for the following components:    Potassium 3.3 (*)     Creatinine 0.48 (*)     All other components within normal limits   URINALYSIS,CULTURE IF INDICATED   ESTIMATED GFR       Point of Care Ultrasound    ED POINT OF CARE ULTRASOUND: LIMITED TRANSABDOMINAL PELVIC/OB    Indication for Exam: Abdominal pain, pregnant    Intraunterine pregnancy Identified:Yes  Fetal Movement: Present  Fetal Heart Rate: 154    Recto-uterine pouch Free Fluid:present      Image retained through [facility] as seen below:       Additional interpretation: None    This study is a limited ultrasound examination performed and interpreted to evaluate for limited conditions as outlined above. There may be other clinically important information contained in the images that is outside this scope. When clinically warranted, a comprehensive ultrasound through the appropriate department is considered.      COURSE & MEDICAL DECISION MAKING    ASSESSMENT, COURSE AND PLAN  Care Narrative:     Patient presents to the emergency department for evaluation of intermittent mild cramping lower abdominal pain radiating to her low back.  Currently 15 weeks gestation, G8, P3.  No history of ectopic pregnancy and has already had a confirmatory IUP during this gestation.  Clinically is very well-appearing, minimally symptomatic with a soft nontender and otherwise unremarkable abdomen on exam.  Vital stable.  Denies vaginal bleeding, abnormal discharge, concern for sexually transmitted  infection.  Bedside ultrasound performed as above redemonstrating a viable intrauterine pregnancy.  Laboratory workup undertaken with no evidence of bacteriuria or UTI and she is not endorsing any UTI symptomology.  CMP obtained with no evidence of cholestasis, kidney injury, electrode abnormality.  She has a mild leukocytosis which may be reflective of her gestation versus mild viral illness versus mild diverticulitis.  Either way given her reassuring examination, minimal symptomology I feel comfortable with the patient discharging home and she is in agreement.  I did discuss the option of a more formal transvaginal ultrasound to assess for acute ovarian pathology but I feel the chances of torsion are very unlikely given no sudden onset severe pain or associated nausea or vomiting and the clinical well appearance of the patient.  Rh evaluation not undertaken as she is Rh+.  Do not feel any further diagnostic imaging for additional intra-abdominal pathology necessary.  Doubt appendicitis as she has no right lower quadrant tenderness or pain, vomiting or fever.  Clinical presentation not suggestive of bowel obstruction, pelvic inflammatory disease.  Patient was medicated for pain with acetaminophen, tolerating p.o. and ultimately discharged in stable condition.  She will follow-up with her OB/GYN and PCP.  Return precautions discussed and all questions answered.    ADDITIONAL PROBLEMS MANAGED    None    DISPOSITION AND DISCUSSIONS  I have discussed management of the patient with the following physicians and BLOSSOM's: None    Discussion of management with other QHP or appropriate source(s): None     Escalation of care considered, and ultimately not performed:diagnostic imaging    FINAL DIAGNOSIS  1. Left lower quadrant abdominal pain    2. 15 weeks gestation of pregnancy         Electronically signed by: Yonis Coleman M.D., 1/12/2025 5:27 AM

## 2025-01-12 NOTE — DISCHARGE INSTRUCTIONS
Please return to the emergency department with any worsening of your pain, heavy vaginal bleeding, abnormal discharge or if you develop any dizziness, lightheadedness, passout or otherwise feeling worse.  Otherwise follow-up with your primary care doctor and OB/GYN.

## 2025-01-12 NOTE — ED TRIAGE NOTES
Patient ambulates to ER with a chief complaint for lower abdominal pain, lower back pain, and a migraine that have started yesterday. Patient states she has had 2 abortions, 2 miscarries, 2 full term live birth, 1 pre term live birth and is currently 15-16 weeks pregnant. Patient denies any blood but does state she is having lower abdominal cramping.

## 2025-01-31 ENCOUNTER — APPOINTMENT (OUTPATIENT)
Dept: OBGYN | Facility: CLINIC | Age: 35
End: 2025-01-31
Payer: MEDICAID

## 2025-01-31 ENCOUNTER — HOSPITAL ENCOUNTER (OUTPATIENT)
Facility: MEDICAL CENTER | Age: 35
End: 2025-01-31
Attending: PHYSICIAN ASSISTANT
Payer: MEDICAID

## 2025-01-31 ENCOUNTER — INITIAL PRENATAL (OUTPATIENT)
Dept: OBGYN | Facility: CLINIC | Age: 35
End: 2025-01-31
Payer: MEDICAID

## 2025-01-31 VITALS — WEIGHT: 259.4 LBS | BODY MASS INDEX: 52.39 KG/M2 | SYSTOLIC BLOOD PRESSURE: 102 MMHG | DIASTOLIC BLOOD PRESSURE: 70 MMHG

## 2025-01-31 DIAGNOSIS — O09.292 HISTORY OF PRE-ECLAMPSIA IN PRIOR PREGNANCY, CURRENTLY PREGNANT IN SECOND TRIMESTER: ICD-10-CM

## 2025-01-31 DIAGNOSIS — Z98.891 HISTORY OF C-SECTION: ICD-10-CM

## 2025-01-31 DIAGNOSIS — E03.9 HYPOTHYROID IN PREGNANCY, ANTEPARTUM, SECOND TRIMESTER: ICD-10-CM

## 2025-01-31 DIAGNOSIS — O99.282 HYPOTHYROID IN PREGNANCY, ANTEPARTUM, SECOND TRIMESTER: ICD-10-CM

## 2025-01-31 DIAGNOSIS — O09.522 ADVANCED MATERNAL AGE IN MULTIGRAVIDA, SECOND TRIMESTER: ICD-10-CM

## 2025-01-31 DIAGNOSIS — O09.892 HISTORY OF PRETERM DELIVERY, CURRENTLY PREGNANT IN SECOND TRIMESTER: ICD-10-CM

## 2025-01-31 PROBLEM — R10.9 AP (ABDOMINAL PAIN): Status: RESOLVED | Noted: 2020-03-02 | Resolved: 2025-01-31

## 2025-01-31 PROBLEM — E87.6 HYPOKALEMIA: Status: RESOLVED | Noted: 2020-03-02 | Resolved: 2025-01-31

## 2025-01-31 PROCEDURE — 87591 N.GONORRHOEAE DNA AMP PROB: CPT

## 2025-01-31 PROCEDURE — 87491 CHLMYD TRACH DNA AMP PROBE: CPT

## 2025-01-31 RX ORDER — LEVOTHYROXINE SODIUM 75 UG/1
75 TABLET ORAL
Qty: 30 TABLET | Refills: 5 | Status: SHIPPED | OUTPATIENT
Start: 2025-01-31

## 2025-01-31 ASSESSMENT — EDINBURGH POSTNATAL DEPRESSION SCALE (EPDS)
I HAVE BEEN SO UNHAPPY THAT I HAVE BEEN CRYING: NO, NEVER
I HAVE BEEN ANXIOUS OR WORRIED FOR NO GOOD REASON: NO, NOT AT ALL
I HAVE BEEN SO UNHAPPY THAT I HAVE HAD DIFFICULTY SLEEPING: NOT AT ALL
I HAVE FELT SCARED OR PANICKY FOR NO GOOD REASON: NO, NOT AT ALL
I HAVE BEEN ABLE TO LAUGH AND SEE THE FUNNY SIDE OF THINGS: AS MUCH AS I ALWAYS COULD
I HAVE BLAMED MYSELF UNNECESSARILY WHEN THINGS WENT WRONG: NO, NEVER
THINGS HAVE BEEN GETTING ON TOP OF ME: NO, I HAVE BEEN COPING AS WELL AS EVER
TOTAL SCORE: 0
I HAVE LOOKED FORWARD WITH ENJOYMENT TO THINGS: AS MUCH AS I EVER DID
I HAVE FELT SAD OR MISERABLE: NO, NOT AT ALL
THE THOUGHT OF HARMING MYSELF HAS OCCURRED TO ME: NEVER

## 2025-01-31 ASSESSMENT — ENCOUNTER SYMPTOMS
DEPRESSION: 0
NERVOUS/ANXIOUS: 0

## 2025-01-31 ASSESSMENT — FIBROSIS 4 INDEX: FIB4 SCORE: 0.6

## 2025-01-31 ASSESSMENT — LIFESTYLE VARIABLES: SUBSTANCE_ABUSE: 0

## 2025-01-31 NOTE — PROGRESS NOTES
Pt. Here for NOB visit.  # [phone]  LMP 9/14/2024  Last pap smear 3yrs WNL at [facility]   Pt was seen at [facility] ER on 1/12/2025 for abdominal pain and lower back pain. bedside U/S done no dates 154 BPM  Pt states no complaints.   FLU vaccine offered and declined.   Genetic testing declined.   EPDS = 0

## 2025-01-31 NOTE — ASSESSMENT & PLAN NOTE
- Start ASA 81mg daily  - Refer to MFM  Orders:    Comp Metabolic Panel; Future    PROTEIN/CREAT RATIO URINE; Future

## 2025-01-31 NOTE — ASSESSMENT & PLAN NOTE
"- PAP requested from Dr Mittal office as done 2023 per pt, all wnl  - F/u 4wk, US 2wk  - Declines all genetic testing as will keep the baby \"no matter what\"  - Start ASA 81mg with PNV  Orders:    PREG CNTR PRENATAL PN; Future    US-OB 2ND 3RD TRI COMPLETE; Future    URINE DRUG SCREEN W/CONF (AR); Future    Chlamydia/GC, PCR (Genital/Anal swab); Future    Referral to Perinatology    " done

## 2025-01-31 NOTE — PROGRESS NOTES
Subjective     Kelli Doherty is a 34 y.o. female who presents with New ob visit. Pt is sure of LMP but had 8 wk US with Dr Lama (records requested) that gave ORA 7/1/25. Dating is by LMP but limited US today and US from Dr Lama, would likely recommend changing ORA to 7/1, as >5 days different.   OBHx: Hx 2 TABs, 2 SABs without complications, also had primary C/S for FTP after IOL and NRFHT then repeat x 2. Pt needed C/S at 35wk with last pregnancy due to severely elevated BPs, likely Preeclampsia at [facility]. Dates may have been incorrect as baby weighed 6lbs. Per pt, no GDM hx or elevated BPs with other pregnancies.  PMHx: 1. Hypothyroidism - stable on meds, 75mcg daily, no labs x 1 yr  2. Asthma - stable, uses inhaler very regularly  3. Anxiety - pt self d/c'd meds with pregnancy (trazodone) but doing very well, no SI, HI or depression symptoms  SHX: 1. C/S x 3 - no surgical or anesthetic complications  2. Ankle repair x 2 due to ligament injuries  3. Lap coral  4. Colonoscopy with biopsy due to colitis after COVID  Allergies: Sulfa - vomiting, Aleve causes rash, iodine, latex  Social: Denies tob, etoh or drug use   Meds; Taking PNV and Synthroid - recommend start ASA 81mg daily  Pt currently denies cramping, bleeding or pain. Small FM.             HPI    Review of Systems   Psychiatric/Behavioral:  Negative for depression and substance abuse. The patient is not nervous/anxious.    All other systems reviewed and are negative.             Objective     /70   Wt 259 lb 6.4 oz   LMP 09/14/2024   BMI 52.39 kg/m²      Physical Exam  Vitals reviewed.   Constitutional:       Appearance: She is well-developed.   HENT:      Head: Normocephalic and atraumatic.   Eyes:      Pupils: Pupils are equal, round, and reactive to light.   Neck:      Thyroid: No thyromegaly.   Cardiovascular:      Rate and Rhythm: Normal rate and regular rhythm.      Heart sounds: Normal heart sounds.   Pulmonary:      Effort:  "Pulmonary effort is normal. No respiratory distress.      Breath sounds: Normal breath sounds.   Abdominal:      General: Bowel sounds are normal. There is no distension.      Palpations: Abdomen is soft.      Tenderness: There is no abdominal tenderness.   Genitourinary:     Exam position: Supine.      Labia:         Right: No rash or tenderness.         Left: No rash or tenderness.       Vagina: Normal. No signs of injury and foreign body. No vaginal discharge or erythema.      Cervix: No cervical motion tenderness.      Uterus: Enlarged (Gravid, uterus c/w 18wk size). Not deviated and not tender.       Adnexa:         Right: No mass or tenderness.          Left: No mass or tenderness.     Musculoskeletal:      Cervical back: Normal range of motion and neck supple.   Skin:     General: Skin is warm and dry.      Findings: No erythema.   Neurological:      Mental Status: She is alert.      Deep Tendon Reflexes: Reflexes are normal and symmetric.   Psychiatric:         Behavior: Behavior normal.         Thought Content: Thought content normal.               Abd US shows single viable IUP at 18w4d by very limited measurements ORA 6/30/25. +FM, +cardiac activity at 140bpm. Ant and fundal placenta, NOHELIA subjectively wnl.      BPD: 4.25cm (4.29, 4.20)  HC: 15.27cm (15.23, 15.31)  FL: 2.80cm (2.79, 2.81)    Per ACOG guidelines, I would recommend changing the ORA based on today's US, as the ORA is 10 days different than dates based on LMP. FINAL ORA 6/30/25 based on todays US and will get US from Dr Lama at 8wk GA.              Assessment & Plan        Assessment & Plan  Advanced maternal age in multigravida, second trimester  - PAP requested from Dr Mittal office as done 2023 per pt, all wnl  - F/u 4wk, US 2wk  - Declines all genetic testing as will keep the baby \"no matter what\"  - Start ASA 81mg with PNV  Orders:    PREG CNTR PRENATAL PN; Future    US-OB 2ND 3RD TRI COMPLETE; Future    URINE DRUG SCREEN W/CONF (AR); " Future    Chlamydia/GC, PCR (Genital/Anal swab); Future    Referral to Perinatology    History of C/S x 3  - Wants BTL/BS when C/S performed       History of PTD at 35wk for PreE       History of pre-eclampsia x 1 - ASA started NOB visit  - Start ASA 81mg daily  - Refer to MFM  Orders:    Comp Metabolic Panel; Future    PROTEIN/CREAT RATIO URINE; Future    Hypothyroid in pregnancy - 75mcg daily    Orders:    TSH+FREE T4

## 2025-02-01 LAB
C TRACH DNA GENITAL QL NAA+PROBE: NEGATIVE
N GONORRHOEA DNA GENITAL QL NAA+PROBE: NEGATIVE
SPECIMEN SOURCE: NORMAL

## 2025-02-24 ENCOUNTER — OFFICE VISIT (OUTPATIENT)
Dept: MATERNAL FETAL MEDICINE | Facility: MEDICAL CENTER | Age: 35
End: 2025-02-24
Payer: MEDICAID

## 2025-02-24 ENCOUNTER — ANCILLARY PROCEDURE (OUTPATIENT)
Dept: MATERNAL FETAL MEDICINE | Facility: MEDICAL CENTER | Age: 35
End: 2025-02-24
Payer: MEDICAID

## 2025-02-24 VITALS
WEIGHT: 261.1 LBS | DIASTOLIC BLOOD PRESSURE: 76 MMHG | BODY MASS INDEX: 52.74 KG/M2 | HEART RATE: 91 BPM | SYSTOLIC BLOOD PRESSURE: 120 MMHG

## 2025-02-24 DIAGNOSIS — O35.BXX0 FETAL CARDIAC ANOMALY COMPLICATING PREGNANCY, ANTEPARTUM, NOT APPLICABLE OR UNSPECIFIED FETUS: ICD-10-CM

## 2025-02-24 DIAGNOSIS — O09.292 HX OF PREECLAMPSIA, PRIOR PREGNANCY, CURRENTLY PREGNANT, SECOND TRIMESTER: ICD-10-CM

## 2025-02-24 DIAGNOSIS — O09.512 AMA (ADVANCED MATERNAL AGE) PRIMIGRAVIDA 35+, SECOND TRIMESTER: ICD-10-CM

## 2025-02-24 DIAGNOSIS — Z3A.21 21 WEEKS GESTATION OF PREGNANCY: ICD-10-CM

## 2025-02-24 PROCEDURE — 76811 OB US DETAILED SNGL FETUS: CPT | Performed by: OBSTETRICS & GYNECOLOGY

## 2025-02-24 PROCEDURE — 99213 OFFICE O/P EST LOW 20 MIN: CPT | Mod: 25 | Performed by: OBSTETRICS & GYNECOLOGY

## 2025-02-24 PROCEDURE — 76817 TRANSVAGINAL US OBSTETRIC: CPT | Performed by: OBSTETRICS & GYNECOLOGY

## 2025-02-24 ASSESSMENT — FIBROSIS 4 INDEX: FIB4 SCORE: 0.6

## 2025-02-26 ENCOUNTER — HOSPITAL ENCOUNTER (OUTPATIENT)
Dept: LAB | Facility: MEDICAL CENTER | Age: 35
End: 2025-02-26
Attending: PHYSICIAN ASSISTANT
Payer: MEDICAID

## 2025-02-26 DIAGNOSIS — O09.292 HISTORY OF PRE-ECLAMPSIA IN PRIOR PREGNANCY, CURRENTLY PREGNANT IN SECOND TRIMESTER: ICD-10-CM

## 2025-02-26 DIAGNOSIS — O09.522 ADVANCED MATERNAL AGE IN MULTIGRAVIDA, SECOND TRIMESTER: ICD-10-CM

## 2025-02-26 LAB
ALBUMIN SERPL BCP-MCNC: 3.4 G/DL (ref 3.2–4.9)
ALBUMIN/GLOB SERPL: 1.1 G/DL
ALP SERPL-CCNC: 89 U/L (ref 30–99)
ALT SERPL-CCNC: 14 U/L (ref 2–50)
ANION GAP SERPL CALC-SCNC: 12 MMOL/L (ref 7–16)
AST SERPL-CCNC: 12 U/L (ref 12–45)
BILIRUB SERPL-MCNC: <0.2 MG/DL (ref 0.1–1.5)
BUN SERPL-MCNC: 7 MG/DL (ref 8–22)
CALCIUM ALBUM COR SERPL-MCNC: 9.8 MG/DL (ref 8.5–10.5)
CALCIUM SERPL-MCNC: 9.3 MG/DL (ref 8.5–10.5)
CHLORIDE SERPL-SCNC: 104 MMOL/L (ref 96–112)
CO2 SERPL-SCNC: 21 MMOL/L (ref 20–33)
CREAT SERPL-MCNC: 0.51 MG/DL (ref 0.5–1.4)
CREAT UR-MCNC: 152 MG/DL
GFR SERPLBLD CREATININE-BSD FMLA CKD-EPI: 123 ML/MIN/1.73 M 2
GLOBULIN SER CALC-MCNC: 3.2 G/DL (ref 1.9–3.5)
GLUCOSE SERPL-MCNC: 95 MG/DL (ref 65–99)
POTASSIUM SERPL-SCNC: 3.3 MMOL/L (ref 3.6–5.5)
PROT SERPL-MCNC: 6.6 G/DL (ref 6–8.2)
PROT UR-MCNC: 12.3 MG/DL (ref 0–15)
PROT/CREAT UR: 81 MG/G (ref 10–107)
SODIUM SERPL-SCNC: 137 MMOL/L (ref 135–145)
T4 FREE SERPL-MCNC: 0.96 NG/DL (ref 0.93–1.7)
TSH SERPL-ACNC: 2.78 UIU/ML (ref 0.35–5.5)

## 2025-02-26 PROCEDURE — 82570 ASSAY OF URINE CREATININE: CPT

## 2025-02-26 PROCEDURE — 36415 COLL VENOUS BLD VENIPUNCTURE: CPT

## 2025-02-26 PROCEDURE — 84439 ASSAY OF FREE THYROXINE: CPT

## 2025-02-26 PROCEDURE — 84443 ASSAY THYROID STIM HORMONE: CPT

## 2025-02-26 PROCEDURE — 80053 COMPREHEN METABOLIC PANEL: CPT

## 2025-02-26 PROCEDURE — 80307 DRUG TEST PRSMV CHEM ANLYZR: CPT

## 2025-02-26 PROCEDURE — 84156 ASSAY OF PROTEIN URINE: CPT

## 2025-02-28 LAB
AMPHET CTO UR CFM-MCNC: NEGATIVE NG/ML
BARBITURATES CTO UR CFM-MCNC: NEGATIVE NG/ML
BENZODIAZ CTO UR CFM-MCNC: NEGATIVE NG/ML
CANNABINOIDS CTO UR CFM-MCNC: NEGATIVE NG/ML
COCAINE CTO UR CFM-MCNC: NEGATIVE NG/ML
CREAT UR-MCNC: 138.7 MG/DL (ref 20–400)
DRUG COMMENT 753798: NORMAL
METHADONE CTO UR CFM-MCNC: NEGATIVE NG/ML
OPIATES CTO UR CFM-MCNC: NEGATIVE NG/ML
PCP CTO UR CFM-MCNC: NEGATIVE NG/ML
PROPOXYPH CTO UR CFM-MCNC: NEGATIVE NG/ML

## 2025-03-05 ENCOUNTER — APPOINTMENT (OUTPATIENT)
Dept: OBGYN | Facility: CLINIC | Age: 35
End: 2025-03-05
Payer: MEDICAID

## 2025-03-05 VITALS — SYSTOLIC BLOOD PRESSURE: 122 MMHG | DIASTOLIC BLOOD PRESSURE: 70 MMHG | WEIGHT: 260 LBS | BODY MASS INDEX: 52.51 KG/M2

## 2025-03-05 DIAGNOSIS — Q21.0 VSD (VENTRICULAR SEPTAL DEFECT): ICD-10-CM

## 2025-03-05 PROCEDURE — 3074F SYST BP LT 130 MM HG: CPT

## 2025-03-05 PROCEDURE — 3078F DIAST BP <80 MM HG: CPT

## 2025-03-05 PROCEDURE — 0502F SUBSEQUENT PRENATAL CARE: CPT

## 2025-03-05 ASSESSMENT — FIBROSIS 4 INDEX: FIB4 SCORE: 0.48

## 2025-03-05 NOTE — PROGRESS NOTES
Pt here today for OBFV   +FM movemnet  No VB, LOF. 's   Phone number [phone] (home)   Pharmacy verified   3rd trimester lab orders pended and instructions given to patient

## 2025-03-05 NOTE — PROGRESS NOTES
S: Pt is a 34 y.o.  at 23w1d gestation here today for routine prenatal care. Pt reports fetal movement; denies cramping, vaginal bleeding, and leaking of fluid.     Reported some contractions a few days ago that went away. Also reports heachaches, takes Tylenol with relief. Has f/us cheduled up with MFM for surveilance during     O: /70   Wt 260 lb    *see prenatal flowsheet*     A: IUP at 23w1d       S=D         Patient Active Problem List    Diagnosis Date Noted    VSD (ventricular septal defect) 2025    Advanced maternal age in multigravida, second trimester 2025    History of C/S x 3 2025    History of PTD at 35wk for PreE 2025    History of pre-eclampsia x 1 - ASA started NOB visit 2025    Hypothyroid in pregnancy - 75mcg daily 2025    Anxiety disorder 2020    Asthma 2020       P:   -Discussed normal s/sx pregnancy appropriate for gestational age general discomforts vs warning signs that necessitate evaluation in OB triage. Reviewed fetal movements appropriate for EGA. Reinforced adequate hydration and nutrition.  -3T labs at [facility]   -Continue levothyroxine and aspirin daily  -US scheduled in April and May with Luis E d/t concerns for small arms and legs  -RTC in 4 weeks for routine prenatal care      Chiquita Munoz C.N.M.

## 2025-04-02 ENCOUNTER — ROUTINE PRENATAL (OUTPATIENT)
Dept: OBGYN | Facility: CLINIC | Age: 35
End: 2025-04-02
Payer: MEDICAID

## 2025-04-02 VITALS — DIASTOLIC BLOOD PRESSURE: 72 MMHG | BODY MASS INDEX: 53.2 KG/M2 | WEIGHT: 263.4 LBS | SYSTOLIC BLOOD PRESSURE: 118 MMHG

## 2025-04-02 DIAGNOSIS — O09.522 ADVANCED MATERNAL AGE IN MULTIGRAVIDA, SECOND TRIMESTER: ICD-10-CM

## 2025-04-02 PROCEDURE — 3078F DIAST BP <80 MM HG: CPT | Performed by: PHYSICIAN ASSISTANT

## 2025-04-02 PROCEDURE — 0502F SUBSEQUENT PRENATAL CARE: CPT | Performed by: PHYSICIAN ASSISTANT

## 2025-04-02 PROCEDURE — 3074F SYST BP LT 130 MM HG: CPT | Performed by: PHYSICIAN ASSISTANT

## 2025-04-02 RX ORDER — ASPIRIN 81 MG/1
81 TABLET ORAL DAILY
COMMUNITY

## 2025-04-02 ASSESSMENT — FIBROSIS 4 INDEX: FIB4 SCORE: 0.49

## 2025-04-02 NOTE — PROGRESS NOTES
Ob f/u. Pt has no complaints with strong cramping or any UC, VB, LOF. +FM - FKC sheet given today. Declines TDaP today but will give next visit. BTL consent signed today. Pt urged to do 1`hr GTT and PNL asap. Pt has f/u with MFM 4/14 and in May for growth. Labor precautions reviewed, daily FKC recommended. RTC 4 wk or sooner prn.

## 2025-04-02 NOTE — PROGRESS NOTES
Pt. Here for OB/F/U, Kick Count sheet given and explained to pt.   Good FM  Growth U/S on 4/14/2025 and 5/12/2025.  Good # [phone]  Pt states no complaints.   Tdap offered and would like to wait till next visit.   BTL consent form signed today.   Pt states has order for 1 HR GTT,  and prenatal panel before her next visit.

## 2025-04-02 NOTE — LETTER
"Count Your Baby's Movements  Another step to a healthy delivery    Kelli Cho Doherty  [facility]  Dept: [phone]    How Many Weeks Pregnant? 27w1d    Date to Begin Counting: today               How to use this chart    One way for your physician to keep track of your baby's health is by knowing how often the baby moves (or \"kicks\") in your womb.  You can help your physician to do this by using this chart every day.    Every day, you should see how many hours it takes for your baby to move 10 times.  Start in the morning, as soon as you get up.    First, write down the time your baby moves until you get to 10.  Check off one box every time your baby moves until you get to 10.  Write down the time you finished counting in the last column.  Total how long it took to count up all 10 movements.  Finally, fill in the box that shows how long this took.  After counting 10 movements, you no longer have to count any more that day.  The next morning, just start counting again as soon as you get up.    What should you call a \"movement\"?  It is hard to say, because it will feel different from one mother to another and from one pregnancy to the next.  The important thing is that you count the movements the same way throughout your pregnancy.  If you have more questions, you should ask your physician.    Count carefully every day!  SAMPLE:  Week 28    How many hours did it take to feel 10 movements?       Start  Time     1     2     3     4     5     6     7     8     9     10   Finish Time   Mon 8:20           11:40   Tue Wed Thu               Fri               Sat               Sun                 IMPORTANT: You should contact your physician if it takes more than two hours for you to feel 10 movements.  Each morning, write down the time and start to count the movements of your baby.  Keep track by checking off one box every time you feel one movement.  When you " "have felt 10 \"kicks\", write down the time you finished counting in the last column.  Then fill in the   box (over the check annamaria) for the number of hours it took.  Be sure to read the complete instructions on the previous page.            "

## 2025-04-14 ENCOUNTER — ANCILLARY PROCEDURE (OUTPATIENT)
Dept: MATERNAL FETAL MEDICINE | Facility: MEDICAL CENTER | Age: 35
End: 2025-04-14
Payer: MEDICAID

## 2025-04-14 VITALS
WEIGHT: 265.1 LBS | HEART RATE: 91 BPM | SYSTOLIC BLOOD PRESSURE: 105 MMHG | BODY MASS INDEX: 53.54 KG/M2 | DIASTOLIC BLOOD PRESSURE: 73 MMHG

## 2025-04-14 DIAGNOSIS — E66.01 SEVERE OBESITY DUE TO EXCESS CALORIES AFFECTING PREGNANCY IN THIRD TRIMESTER (HCC): ICD-10-CM

## 2025-04-14 DIAGNOSIS — O09.512 AMA (ADVANCED MATERNAL AGE) PRIMIGRAVIDA 35+, SECOND TRIMESTER: ICD-10-CM

## 2025-04-14 DIAGNOSIS — O09.523 AMA (ADVANCED MATERNAL AGE) MULTIGRAVIDA 35+, THIRD TRIMESTER: ICD-10-CM

## 2025-04-14 DIAGNOSIS — Z98.891 PREVIOUS CESAREAN SECTION: ICD-10-CM

## 2025-04-14 DIAGNOSIS — O35.BXX0 FETAL CARDIAC ANOMALY COMPLICATING PREGNANCY, ANTEPARTUM, NOT APPLICABLE OR UNSPECIFIED FETUS: ICD-10-CM

## 2025-04-14 DIAGNOSIS — O99.213 SEVERE OBESITY DUE TO EXCESS CALORIES AFFECTING PREGNANCY IN THIRD TRIMESTER (HCC): ICD-10-CM

## 2025-04-14 DIAGNOSIS — Z3A.28 28 WEEKS GESTATION OF PREGNANCY: ICD-10-CM

## 2025-04-14 PROCEDURE — 76816 OB US FOLLOW-UP PER FETUS: CPT | Performed by: OBSTETRICS & GYNECOLOGY

## 2025-04-14 ASSESSMENT — FIBROSIS 4 INDEX: FIB4 SCORE: 0.49

## 2025-04-18 ENCOUNTER — HOSPITAL ENCOUNTER (OUTPATIENT)
Dept: LAB | Facility: MEDICAL CENTER | Age: 35
End: 2025-04-18
Attending: PHYSICIAN ASSISTANT
Payer: MEDICAID

## 2025-04-18 DIAGNOSIS — O09.522 ADVANCED MATERNAL AGE IN MULTIGRAVIDA, SECOND TRIMESTER: ICD-10-CM

## 2025-04-18 LAB — GLUCOSE 1H P 50 G GLC PO SERPL-MCNC: 137 MG/DL (ref 70–139)

## 2025-04-18 PROCEDURE — 82950 GLUCOSE TEST: CPT

## 2025-04-18 PROCEDURE — 36415 COLL VENOUS BLD VENIPUNCTURE: CPT

## 2025-04-23 ENCOUNTER — ROUTINE PRENATAL (OUTPATIENT)
Dept: OBGYN | Facility: CLINIC | Age: 35
End: 2025-04-23
Payer: MEDICAID

## 2025-04-23 VITALS — SYSTOLIC BLOOD PRESSURE: 110 MMHG | BODY MASS INDEX: 53.32 KG/M2 | WEIGHT: 264 LBS | DIASTOLIC BLOOD PRESSURE: 72 MMHG

## 2025-04-23 DIAGNOSIS — O09.522 ADVANCED MATERNAL AGE IN MULTIGRAVIDA, SECOND TRIMESTER: ICD-10-CM

## 2025-04-23 DIAGNOSIS — N80.9 ENDOMETRIOSIS: ICD-10-CM

## 2025-04-23 PROCEDURE — 0502F SUBSEQUENT PRENATAL CARE: CPT | Performed by: PHYSICIAN ASSISTANT

## 2025-04-23 PROCEDURE — 90715 TDAP VACCINE 7 YRS/> IM: CPT | Mod: JZ | Performed by: PHYSICIAN ASSISTANT

## 2025-04-23 PROCEDURE — 90471 IMMUNIZATION ADMIN: CPT | Performed by: PHYSICIAN ASSISTANT

## 2025-04-23 ASSESSMENT — FIBROSIS 4 INDEX: FIB4 SCORE: 0.49

## 2025-04-23 NOTE — PROGRESS NOTES
"Pt has no complaints with cramping, UCs, VB, LOF though pt has had sharp pain in lower abd at night and occ upper abd pain. Pt working nights but now at sitting position only. Compression stockings recommended. +FM. 1hr GTT, PIH, TSH wnl - pt notified. PNL weren't drawn, so pt aware she needs to get those done as well. US for growth wnl, pt notified, f/u 5/23 per MFM - pt aware. TDaP given today, BTL consent signed today - pt hesitant to have procedure but knows it is the \"right thing to do.\" Pt also asking about potential hysterectomy after delivery due to endometriosis. PTL precautions stressed as pt had spotting x 1 recently after argument with FOB. Pt also notes occ HA - likely stress, as BP stable, will try massage and warm packs to shoulders/neck, Call if worsening. Daily FKC recommended. RTC 2 wk or sooner prn.   "

## 2025-04-23 NOTE — PROGRESS NOTES
OB follow up   + fetal movement.  No VB, LOF or UC's.  Phone # [phone]  Preferred pharmacy confirmed.  PNP not done yet. Order reprinted and given to patient.  Growth US scheduled for 5/23/25  TDap today

## 2025-05-07 ENCOUNTER — APPOINTMENT (OUTPATIENT)
Dept: OBGYN | Facility: CLINIC | Age: 35
End: 2025-05-07
Payer: MEDICAID

## 2025-05-12 ENCOUNTER — APPOINTMENT (OUTPATIENT)
Dept: MATERNAL FETAL MEDICINE | Facility: MEDICAL CENTER | Age: 35
End: 2025-05-12
Payer: MEDICAID

## 2025-05-16 ENCOUNTER — APPOINTMENT (OUTPATIENT)
Dept: RADIOLOGY | Facility: MEDICAL CENTER | Age: 35
End: 2025-05-16
Payer: MEDICAID

## 2025-05-16 ENCOUNTER — HOSPITAL ENCOUNTER (EMERGENCY)
Facility: MEDICAL CENTER | Age: 35
End: 2025-05-16
Attending: OBSTETRICS & GYNECOLOGY | Admitting: OBSTETRICS & GYNECOLOGY
Payer: MEDICAID

## 2025-05-16 VITALS
DIASTOLIC BLOOD PRESSURE: 59 MMHG | HEART RATE: 98 BPM | RESPIRATION RATE: 16 BRPM | BODY MASS INDEX: 53.22 KG/M2 | HEIGHT: 59 IN | OXYGEN SATURATION: 96 % | WEIGHT: 264 LBS | SYSTOLIC BLOOD PRESSURE: 111 MMHG | TEMPERATURE: 97.2 F

## 2025-05-16 LAB
APPEARANCE UR: CLEAR
BILIRUB UR QL STRIP.AUTO: NEGATIVE
COLOR UR AUTO: YELLOW
GLUCOSE UR QL STRIP.AUTO: NEGATIVE MG/DL
KETONES UR QL STRIP.AUTO: NEGATIVE MG/DL
LEUKOCYTE ESTERASE UR QL STRIP.AUTO: NEGATIVE
NITRITE UR QL STRIP.AUTO: NEGATIVE
PH UR STRIP.AUTO: 6 [PH] (ref 5–8)
PROT UR QL STRIP: NEGATIVE MG/DL
RBC UR QL AUTO: NEGATIVE
SP GR UR STRIP.AUTO: 1.01 (ref 1–1.03)
UROBILINOGEN UR STRIP.AUTO-MCNC: 0.2 MG/DL

## 2025-05-16 PROCEDURE — 99282 EMERGENCY DEPT VISIT SF MDM: CPT

## 2025-05-16 PROCEDURE — 59025 FETAL NON-STRESS TEST: CPT | Mod: XU

## 2025-05-16 PROCEDURE — 81002 URINALYSIS NONAUTO W/O SCOPE: CPT

## 2025-05-16 PROCEDURE — 76819 FETAL BIOPHYS PROFIL W/O NST: CPT

## 2025-05-16 PROCEDURE — 99284 EMERGENCY DEPT VISIT MOD MDM: CPT

## 2025-05-16 ASSESSMENT — PAIN SCALES - GENERAL: PAINLEVEL: 0 - NO PAIN

## 2025-05-16 ASSESSMENT — FIBROSIS 4 INDEX: FIB4 SCORE: 0.49

## 2025-05-17 NOTE — PROGRESS NOTES
G 8 P 3 EDC 7/1/25 EGA 33.3     1948 - Pt arrives to L & D in stable condition, presenting for DFM. Patient denies LOF, denies VB, denies CTX. Patient affirms + FM this morning, following that no movement throughout the day. Patient denies complications this pregnancy.     2100 - Report given to BASSAM Ho CNM. CNM to be by to see patient.     2315 - BASSAM Ho CNM to bedside. Discharge received.     2324 - This RN to bedside. L & D discharge instructions, PTL/labor precautions, FKC/normal fetal movement, follow up and when to return reviewed. All questions answered, pt expresses understanding. No further needs stated. Patient ambulates off unit in stable condition.

## 2025-05-17 NOTE — ED PROVIDER NOTES
"S: Pt is a 35 y.o.  at 33w3d with Estimated Date of Delivery: 25 who presents to triage c/o decreased fetal movement since this afternoon.  Denies VB, RUCs, LOF.       O: /59   Pulse 98   Temp 36.2 °C (97.2 °F) (Temporal)   Resp 16   Ht 1.499 m (4' 11\")   Wt 120 kg (264 lb)   LMP 2024   SpO2 96%   BMI 53.32 kg/m²          NST: Done and read by me         Indication: Decreased fetal movement        FHR: 130       Variability: moderate       Acclerations: present       Decelerations: absent        Reactive: Reactive NST per my read         Kistler: No UCs       SVE: Deferred    US-BIOPHYSICAL PROFILE  Narrative: 2025 9:55 PM    2025 9:55 PM    HISTORY/REASON FOR EXAM:  Abnormal fetal heart rate or rhythm    TECHNIQUE/EXAM DESCRIPTION:  Ultrasound for biophysical profile.    COMPARISON:  2025 growth ultrasound report    FINDINGS:  Single intrauterine gestation is identified which has a heart rate of 139 bpm.    Amniotic fluid volume is 13.58 cm.    Vertex presentation.    Biophysical profile score is 8  out of 8 (movement 2, tone 2, breathing 2, fluid 2).  Impression: 1. Normal biophysical profile ultrasound.            A/P  Patient Active Problem List    Diagnosis Date Noted    Endometriosis - desires hysterectomy after delivery 2025    VSD (ventricular septal defect) 2025    Advanced maternal age in multigravida, second trimester 2025    History of C/S x 3 2025    History of PTD at 35wk for PreE 2025    History of pre-eclampsia x 1 - ASA started NOB visit 2025    Hypothyroid in pregnancy - 75mcg daily 2025    Anxiety disorder 2020    Asthma 2020       1.  IUP @ 33w3d  2.  Reactive NST   3.  8/8 BPP  4.  PTL, preeclampsia and FM precautions given   5.  Pt now feeling FM, comfortable with plan to D/C  6.  F/u TPC as scheduled.    Irene Ho CNM    "

## 2025-05-21 ENCOUNTER — ROUTINE PRENATAL (OUTPATIENT)
Dept: OBGYN | Facility: CLINIC | Age: 35
End: 2025-05-21
Payer: MEDICAID

## 2025-05-21 VITALS — WEIGHT: 262.6 LBS | SYSTOLIC BLOOD PRESSURE: 118 MMHG | BODY MASS INDEX: 53.04 KG/M2 | DIASTOLIC BLOOD PRESSURE: 74 MMHG

## 2025-05-21 DIAGNOSIS — O09.522 ADVANCED MATERNAL AGE IN MULTIGRAVIDA, SECOND TRIMESTER: Primary | ICD-10-CM

## 2025-05-21 PROCEDURE — 3074F SYST BP LT 130 MM HG: CPT | Performed by: PHYSICIAN ASSISTANT

## 2025-05-21 PROCEDURE — 0502F SUBSEQUENT PRENATAL CARE: CPT | Performed by: PHYSICIAN ASSISTANT

## 2025-05-21 PROCEDURE — 3078F DIAST BP <80 MM HG: CPT | Performed by: PHYSICIAN ASSISTANT

## 2025-05-21 ASSESSMENT — FIBROSIS 4 INDEX: FIB4 SCORE: 0.49

## 2025-05-21 NOTE — PROGRESS NOTES
Pt has no complaints with cramping, regular or strong UCs, Vb, LOF. +FM but pt was seen in L&D for decr FM on 5/16. Pt notes still not feeling as much movement as before, but feels she is meeting her kick counts. GBS next visit. US 5/22 for growth. Pt will do PNL asap, as they werent drawn when pt did 1hr GTT. PTL precautions stressed. Daily FKC recommended. Repeat C/S and BTL referral sent today for 39 wk - pt aware. NST starting next visit for AMA. RTC 1 wk or sooner prn.

## 2025-05-22 ENCOUNTER — APPOINTMENT (OUTPATIENT)
Dept: MATERNAL FETAL MEDICINE | Facility: MEDICAL CENTER | Age: 35
End: 2025-05-22
Attending: OBSTETRICS & GYNECOLOGY
Payer: MEDICAID

## 2025-05-27 ENCOUNTER — ANCILLARY PROCEDURE (OUTPATIENT)
Dept: OBGYN | Facility: CLINIC | Age: 35
End: 2025-05-27
Payer: MEDICAID

## 2025-05-27 VITALS
SYSTOLIC BLOOD PRESSURE: 109 MMHG | DIASTOLIC BLOOD PRESSURE: 78 MMHG | BODY MASS INDEX: 52.78 KG/M2 | HEIGHT: 59 IN | WEIGHT: 261.8 LBS | HEART RATE: 102 BPM

## 2025-05-27 DIAGNOSIS — O09.512 AMA (ADVANCED MATERNAL AGE) PRIMIGRAVIDA 35+, SECOND TRIMESTER: ICD-10-CM

## 2025-05-27 PROCEDURE — 76816 OB US FOLLOW-UP PER FETUS: CPT | Performed by: OBSTETRICS & GYNECOLOGY

## 2025-05-27 ASSESSMENT — FIBROSIS 4 INDEX: FIB4 SCORE: 0.49

## 2025-06-05 ENCOUNTER — HOSPITAL ENCOUNTER (OUTPATIENT)
Facility: MEDICAL CENTER | Age: 35
End: 2025-06-05
Attending: PHYSICIAN ASSISTANT
Payer: MEDICAID

## 2025-06-05 ENCOUNTER — ROUTINE PRENATAL (OUTPATIENT)
Dept: OBGYN | Facility: CLINIC | Age: 35
End: 2025-06-05
Payer: MEDICAID

## 2025-06-05 VITALS — BODY MASS INDEX: 53.89 KG/M2 | SYSTOLIC BLOOD PRESSURE: 138 MMHG | DIASTOLIC BLOOD PRESSURE: 66 MMHG | WEIGHT: 266.8 LBS

## 2025-06-05 DIAGNOSIS — O09.522 ADVANCED MATERNAL AGE IN MULTIGRAVIDA, SECOND TRIMESTER: Primary | ICD-10-CM

## 2025-06-05 DIAGNOSIS — Z98.891 HISTORY OF C-SECTION: ICD-10-CM

## 2025-06-05 DIAGNOSIS — O09.522 ADVANCED MATERNAL AGE IN MULTIGRAVIDA, SECOND TRIMESTER: ICD-10-CM

## 2025-06-05 PROCEDURE — 3075F SYST BP GE 130 - 139MM HG: CPT | Performed by: PHYSICIAN ASSISTANT

## 2025-06-05 PROCEDURE — 3078F DIAST BP <80 MM HG: CPT | Performed by: PHYSICIAN ASSISTANT

## 2025-06-05 PROCEDURE — 0502F SUBSEQUENT PRENATAL CARE: CPT | Performed by: PHYSICIAN ASSISTANT

## 2025-06-05 PROCEDURE — 87150 DNA/RNA AMPLIFIED PROBE: CPT

## 2025-06-05 PROCEDURE — 87081 CULTURE SCREEN ONLY: CPT

## 2025-06-05 ASSESSMENT — FIBROSIS 4 INDEX: FIB4 SCORE: 0.49

## 2025-06-05 NOTE — PROGRESS NOTES
"Pt has no complaints with cramping, regular or strong UCs, Vb, LOF though pt notes occ \"Alek Alvarez\" UCs but never regular. +FM. Pt tried to do PNL but wwas told she needed an extra code - lab slip printed out again. C/S and BTL/BS referral for 39 wk. PTL Precautions stressed today. Daily FKC recommended. US wnl, EFW and NOHELIA wnl though HC 7%, BPD <1% - MFM reviewed and suggests no other intervention. Pt declines NSTs but will monitor closely for AMA. GBS collected. RTC 1 wk or sooner prn.   "

## 2025-06-05 NOTE — PROGRESS NOTES
Pt. here for Ob f/u and GBS today. Good # [phone]  Good FM  Pt states having [place] Alvarez contractions.   Pt states tried to do her PNP in the [facility] lab but was told needs ICD10 code.

## 2025-06-06 LAB — GP B STREP DNA SPEC QL NAA+PROBE: NEGATIVE

## 2025-06-11 ENCOUNTER — HOSPITAL ENCOUNTER (OUTPATIENT)
Dept: LAB | Facility: MEDICAL CENTER | Age: 35
End: 2025-06-11
Attending: PHYSICIAN ASSISTANT
Payer: MEDICAID

## 2025-06-11 DIAGNOSIS — O09.522 ADVANCED MATERNAL AGE IN MULTIGRAVIDA, SECOND TRIMESTER: ICD-10-CM

## 2025-06-11 LAB
ABO GROUP BLD: NORMAL
BLD GP AB SCN SERPL QL: NORMAL
ERYTHROCYTE [DISTWIDTH] IN BLOOD BY AUTOMATED COUNT: 48.3 FL (ref 35.9–50)
HBV SURFACE AG SER QL: ABNORMAL
HCT VFR BLD AUTO: 37.5 % (ref 37–47)
HCV AB SER QL: ABNORMAL
HGB BLD-MCNC: 12.2 G/DL (ref 12–16)
HIV 1+2 AB+HIV1 P24 AG SERPL QL IA: NORMAL
MCH RBC QN AUTO: 27.5 PG (ref 27–33)
MCHC RBC AUTO-ENTMCNC: 32.5 G/DL (ref 32.2–35.5)
MCV RBC AUTO: 84.5 FL (ref 81.4–97.8)
PLATELET # BLD AUTO: 245 K/UL (ref 164–446)
PMV BLD AUTO: 12.5 FL (ref 9–12.9)
RBC # BLD AUTO: 4.44 M/UL (ref 4.2–5.4)
RH BLD: NORMAL
RUBV AB SER QL: 27.3 IU/ML
T PALLIDUM AB SER QL IA: ABNORMAL
WBC # BLD AUTO: 13.4 K/UL (ref 4.8–10.8)

## 2025-06-11 PROCEDURE — 86780 TREPONEMA PALLIDUM: CPT

## 2025-06-11 PROCEDURE — 87086 URINE CULTURE/COLONY COUNT: CPT

## 2025-06-11 PROCEDURE — 85027 COMPLETE CBC AUTOMATED: CPT

## 2025-06-11 PROCEDURE — 87340 HEPATITIS B SURFACE AG IA: CPT

## 2025-06-11 PROCEDURE — 86900 BLOOD TYPING SEROLOGIC ABO: CPT

## 2025-06-11 PROCEDURE — 87389 HIV-1 AG W/HIV-1&-2 AB AG IA: CPT

## 2025-06-11 PROCEDURE — 86850 RBC ANTIBODY SCREEN: CPT

## 2025-06-11 PROCEDURE — 36415 COLL VENOUS BLD VENIPUNCTURE: CPT

## 2025-06-11 PROCEDURE — 86762 RUBELLA ANTIBODY: CPT

## 2025-06-11 PROCEDURE — 86901 BLOOD TYPING SEROLOGIC RH(D): CPT

## 2025-06-11 PROCEDURE — 86803 HEPATITIS C AB TEST: CPT

## 2025-06-12 ENCOUNTER — ROUTINE PRENATAL (OUTPATIENT)
Dept: OBGYN | Facility: CLINIC | Age: 35
End: 2025-06-12
Payer: MEDICAID

## 2025-06-12 VITALS — WEIGHT: 268.4 LBS | SYSTOLIC BLOOD PRESSURE: 110 MMHG | DIASTOLIC BLOOD PRESSURE: 68 MMHG | BODY MASS INDEX: 54.21 KG/M2

## 2025-06-12 DIAGNOSIS — O09.522 ADVANCED MATERNAL AGE IN MULTIGRAVIDA, SECOND TRIMESTER: Primary | ICD-10-CM

## 2025-06-12 PROCEDURE — 0502F SUBSEQUENT PRENATAL CARE: CPT | Performed by: PHYSICIAN ASSISTANT

## 2025-06-12 PROCEDURE — 3074F SYST BP LT 130 MM HG: CPT | Performed by: PHYSICIAN ASSISTANT

## 2025-06-12 PROCEDURE — 3078F DIAST BP <80 MM HG: CPT | Performed by: PHYSICIAN ASSISTANT

## 2025-06-12 ASSESSMENT — FIBROSIS 4 INDEX: FIB4 SCORE: 0.48

## 2025-06-12 NOTE — PROGRESS NOTES
Pt has no complaints with cramping, regular or strong UCs, Vb, LOF. +FM. Pt continues to have some LE edema, HA and heartburn. C/S scheduled 6/28 at 2pm with Dr Sarah - info given today and all questions answered. Declines NST for AMA, prefers daily FKC. PNL wnl - pt aware. Will stop ASA now. PTL precautions stressed, daily FKC recommended. RTC 1 wk or sooner prn.

## 2025-06-12 NOTE — PROGRESS NOTES
Pt. Here for OB/FU. Reports Good FM.   Good # [phone]  Pt states has been having swelling of her feet still having headaches, heartburn and irma Alvarez,   Pt is scheduled for a Repeat  with sterilization on 25 at 2:00 pm with Dr. Sarah.,pt aware.   NST declined.

## 2025-06-13 LAB
BACTERIA UR CULT: NORMAL
SIGNIFICANT IND 70042: NORMAL
SITE SITE: NORMAL
SOURCE SOURCE: NORMAL

## 2025-06-17 ENCOUNTER — APPOINTMENT (OUTPATIENT)
Dept: ADMISSIONS | Facility: MEDICAL CENTER | Age: 35
End: 2025-06-17
Attending: OBSTETRICS & GYNECOLOGY
Payer: MEDICAID

## 2025-06-19 ENCOUNTER — ROUTINE PRENATAL (OUTPATIENT)
Dept: OBGYN | Facility: CLINIC | Age: 35
End: 2025-06-19
Payer: MEDICAID

## 2025-06-19 VITALS — DIASTOLIC BLOOD PRESSURE: 78 MMHG | BODY MASS INDEX: 54.57 KG/M2 | WEIGHT: 270.2 LBS | SYSTOLIC BLOOD PRESSURE: 114 MMHG

## 2025-06-19 DIAGNOSIS — O09.522 ADVANCED MATERNAL AGE IN MULTIGRAVIDA, SECOND TRIMESTER: ICD-10-CM

## 2025-06-19 DIAGNOSIS — R60.9 SWELLING: Primary | ICD-10-CM

## 2025-06-19 LAB
APPEARANCE UR: CLEAR
BILIRUB UR STRIP-MCNC: NEGATIVE MG/DL
COLOR UR AUTO: YELLOW
GLUCOSE UR STRIP.AUTO-MCNC: NEGATIVE MG/DL
KETONES UR STRIP.AUTO-MCNC: NEGATIVE MG/DL
LEUKOCYTE ESTERASE UR QL STRIP.AUTO: NEGATIVE
NITRITE UR QL STRIP.AUTO: NEGATIVE
PH UR STRIP.AUTO: 5.5 [PH] (ref 5–8)
PROT UR QL STRIP: NEGATIVE MG/DL
RBC UR QL AUTO: NEGATIVE
SP GR UR STRIP.AUTO: 1.02
UROBILINOGEN UR STRIP-MCNC: 0.2 MG/DL

## 2025-06-19 PROCEDURE — 3078F DIAST BP <80 MM HG: CPT | Performed by: PHYSICIAN ASSISTANT

## 2025-06-19 PROCEDURE — 3074F SYST BP LT 130 MM HG: CPT | Performed by: PHYSICIAN ASSISTANT

## 2025-06-19 PROCEDURE — 0502F SUBSEQUENT PRENATAL CARE: CPT | Performed by: PHYSICIAN ASSISTANT

## 2025-06-19 PROCEDURE — 81002 URINALYSIS NONAUTO W/O SCOPE: CPT | Performed by: PHYSICIAN ASSISTANT

## 2025-06-19 ASSESSMENT — FIBROSIS 4 INDEX: FIB4 SCORE: 0.48

## 2025-06-19 NOTE — PROGRESS NOTES
Pt has no complaints with cramping, VB, LOF though pt has had a few UCs over past week, but not regular or strong enough to go to L&D. Pt has had continued HA, occ BLE edema, though denies blurred vision, epigastric pain or swelling in hands and face. BP stable today but will check urine. Pt declines NST today, also declines PIH labs, though I recommend, due to age. Labor precautions reviewed. Daily FKC recommended. C/S 6/28 though pt aware if she has any symptoms of pre-E or labor to RTC asap. Work note to start maternity leave now.     UA: Neg protein, neg glucose, neg nitrites, neg leuk

## 2025-06-19 NOTE — LETTER
June 19, 2025       Patient: Kelli Doherty   YOB: 1990   Date of Visit: 6/19/2025         To Whom It May Concern:    In my medical opinion, I recommend that Kelli Doherty start her maternity leave as of 6/20/25 and may return 6 weeks after delivery, estimated to be ~8/9/25. Thank you.     If you have any questions or concerns, please don't hesitate to call [phone]          Sincerely,          NORTH Hernandez.

## 2025-06-19 NOTE — PROGRESS NOTES
Pt here today for OBFV   +FM movemnet  No VB, LOF.   Uc's far apart   Phone number [phone]  Pharmacy verified   C/S 6-28-25  Nst declined

## 2025-06-24 ENCOUNTER — PRE-ADMISSION TESTING (OUTPATIENT)
Dept: ADMISSIONS | Facility: MEDICAL CENTER | Age: 35
End: 2025-06-24
Attending: OBSTETRICS & GYNECOLOGY
Payer: MEDICAID

## 2025-06-24 NOTE — PREADMIT AVS NOTE
Current Medications   Medication Instructions    aspirin 81 MG EC tablet Follow instructions from surgeon or specialist.    Prenatal MV-Min-Fe Fum-FA-DHA (PRENATAL 1 PO) Hold medication day of procedure    levothyroxine (SYNTHROID) 75 MCG Tab Continue taking medication as prescribed, including morning of procedure     albuterol 108 (90 Base) MCG/ACT Aero Soln inhalation aerosol As needed medication, may take if needed, including morning of procedure

## 2025-06-26 ENCOUNTER — ANESTHESIA EVENT (OUTPATIENT)
Dept: OBGYN | Facility: MEDICAL CENTER | Age: 35
End: 2025-06-26
Payer: MEDICAID

## 2025-06-26 ENCOUNTER — ANESTHESIA (OUTPATIENT)
Dept: OBGYN | Facility: MEDICAL CENTER | Age: 35
End: 2025-06-26
Payer: MEDICAID

## 2025-06-26 ENCOUNTER — TELEPHONE (OUTPATIENT)
Dept: OBGYN | Facility: CLINIC | Age: 35
End: 2025-06-26
Payer: MEDICAID

## 2025-06-26 ENCOUNTER — HOSPITAL ENCOUNTER (INPATIENT)
Facility: MEDICAL CENTER | Age: 35
LOS: 3 days | End: 2025-06-29
Attending: STUDENT IN AN ORGANIZED HEALTH CARE EDUCATION/TRAINING PROGRAM | Admitting: STUDENT IN AN ORGANIZED HEALTH CARE EDUCATION/TRAINING PROGRAM
Payer: MEDICAID

## 2025-06-26 DIAGNOSIS — Z87.59 CESAREAN DELIV DUE TO PREVIOUS DIFFICULT DELIV, DELIV, CURR HOSPITALIZ: Primary | ICD-10-CM

## 2025-06-26 LAB
ABO GROUP BLD: NORMAL
BASOPHILS # BLD AUTO: 0.2 % (ref 0–1.8)
BASOPHILS # BLD: 0.03 K/UL (ref 0–0.12)
BLD GP AB SCN SERPL QL: NORMAL
EOSINOPHIL # BLD AUTO: 0.1 K/UL (ref 0–0.51)
EOSINOPHIL NFR BLD: 0.6 % (ref 0–6.9)
ERYTHROCYTE [DISTWIDTH] IN BLOOD BY AUTOMATED COUNT: 46.6 FL (ref 35.9–50)
HCT VFR BLD AUTO: 40.8 % (ref 37–47)
HGB BLD-MCNC: 13.7 G/DL (ref 12–16)
IMM GRANULOCYTES # BLD AUTO: 0.16 K/UL (ref 0–0.11)
IMM GRANULOCYTES NFR BLD AUTO: 1 % (ref 0–0.9)
LYMPHOCYTES # BLD AUTO: 2.52 K/UL (ref 1–4.8)
LYMPHOCYTES NFR BLD: 15.3 % (ref 22–41)
MCH RBC QN AUTO: 27.5 PG (ref 27–33)
MCHC RBC AUTO-ENTMCNC: 33.6 G/DL (ref 32.2–35.5)
MCV RBC AUTO: 81.8 FL (ref 81.4–97.8)
MONOCYTES # BLD AUTO: 0.73 K/UL (ref 0–0.85)
MONOCYTES NFR BLD AUTO: 4.4 % (ref 0–13.4)
NEUTROPHILS # BLD AUTO: 12.9 K/UL (ref 1.82–7.42)
NEUTROPHILS NFR BLD: 78.5 % (ref 44–72)
NRBC # BLD AUTO: 0 K/UL
NRBC BLD-RTO: 0 /100 WBC (ref 0–0.2)
PLATELET # BLD AUTO: 242 K/UL (ref 164–446)
PMV BLD AUTO: 12.3 FL (ref 9–12.9)
RBC # BLD AUTO: 4.99 M/UL (ref 4.2–5.4)
RH BLD: NORMAL
T PALLIDUM AB SER QL IA: NORMAL
WBC # BLD AUTO: 16.4 K/UL (ref 4.8–10.8)

## 2025-06-26 PROCEDURE — 88302 TISSUE EXAM BY PATHOLOGIST: CPT | Performed by: PATHOLOGY

## 2025-06-26 PROCEDURE — A9270 NON-COVERED ITEM OR SERVICE: HCPCS | Performed by: STUDENT IN AN ORGANIZED HEALTH CARE EDUCATION/TRAINING PROGRAM

## 2025-06-26 PROCEDURE — 160192 HCHG ANESTHESIA COMPLEX: Performed by: STUDENT IN AN ORGANIZED HEALTH CARE EDUCATION/TRAINING PROGRAM

## 2025-06-26 PROCEDURE — 160196 HCHG PACU COMPLEX - EA ADDL 30 MINS: Performed by: STUDENT IN AN ORGANIZED HEALTH CARE EDUCATION/TRAINING PROGRAM

## 2025-06-26 PROCEDURE — 59510 CESAREAN DELIVERY: CPT | Performed by: STUDENT IN AN ORGANIZED HEALTH CARE EDUCATION/TRAINING PROGRAM

## 2025-06-26 PROCEDURE — 700101 HCHG RX REV CODE 250: Performed by: STUDENT IN AN ORGANIZED HEALTH CARE EDUCATION/TRAINING PROGRAM

## 2025-06-26 PROCEDURE — 700111 HCHG RX REV CODE 636 W/ 250 OVERRIDE (IP): Mod: JZ | Performed by: STUDENT IN AN ORGANIZED HEALTH CARE EDUCATION/TRAINING PROGRAM

## 2025-06-26 PROCEDURE — 160002 HCHG RECOVERY MINUTES (STAT): Performed by: STUDENT IN AN ORGANIZED HEALTH CARE EDUCATION/TRAINING PROGRAM

## 2025-06-26 PROCEDURE — 160029 HCHG SURGERY MINUTES - 1ST 30 MINS LEVEL 4: Performed by: STUDENT IN AN ORGANIZED HEALTH CARE EDUCATION/TRAINING PROGRAM

## 2025-06-26 PROCEDURE — 160195 HCHG PACU COMPLEX - 1ST 60 MINS: Performed by: STUDENT IN AN ORGANIZED HEALTH CARE EDUCATION/TRAINING PROGRAM

## 2025-06-26 PROCEDURE — 36415 COLL VENOUS BLD VENIPUNCTURE: CPT

## 2025-06-26 PROCEDURE — 59514 CESAREAN DELIVERY ONLY: CPT | Mod: 80 | Performed by: STUDENT IN AN ORGANIZED HEALTH CARE EDUCATION/TRAINING PROGRAM

## 2025-06-26 PROCEDURE — C1755 CATHETER, INTRASPINAL: HCPCS | Performed by: STUDENT IN AN ORGANIZED HEALTH CARE EDUCATION/TRAINING PROGRAM

## 2025-06-26 PROCEDURE — 86780 TREPONEMA PALLIDUM: CPT

## 2025-06-26 PROCEDURE — 86850 RBC ANTIBODY SCREEN: CPT

## 2025-06-26 PROCEDURE — 160041 HCHG SURGERY MINUTES - EA ADDL 1 MIN LEVEL 4: Performed by: STUDENT IN AN ORGANIZED HEALTH CARE EDUCATION/TRAINING PROGRAM

## 2025-06-26 PROCEDURE — 88302 TISSUE EXAM BY PATHOLOGIST: CPT | Mod: 26 | Performed by: PATHOLOGY

## 2025-06-26 PROCEDURE — 86901 BLOOD TYPING SEROLOGIC RH(D): CPT

## 2025-06-26 PROCEDURE — 85025 COMPLETE CBC W/AUTO DIFF WBC: CPT

## 2025-06-26 PROCEDURE — 700105 HCHG RX REV CODE 258: Performed by: STUDENT IN AN ORGANIZED HEALTH CARE EDUCATION/TRAINING PROGRAM

## 2025-06-26 PROCEDURE — 160048 HCHG OR STATISTICAL LEVEL 1-5: Performed by: STUDENT IN AN ORGANIZED HEALTH CARE EDUCATION/TRAINING PROGRAM

## 2025-06-26 PROCEDURE — 700111 HCHG RX REV CODE 636 W/ 250 OVERRIDE (IP)

## 2025-06-26 PROCEDURE — 770002 HCHG ROOM/CARE - OB PRIVATE (112)

## 2025-06-26 PROCEDURE — 86900 BLOOD TYPING SEROLOGIC ABO: CPT

## 2025-06-26 PROCEDURE — 700105 HCHG RX REV CODE 258

## 2025-06-26 PROCEDURE — 700102 HCHG RX REV CODE 250 W/ 637 OVERRIDE(OP): Performed by: STUDENT IN AN ORGANIZED HEALTH CARE EDUCATION/TRAINING PROGRAM

## 2025-06-26 RX ORDER — OXYCODONE HYDROCHLORIDE 5 MG/1
5 TABLET ORAL EVERY 4 HOURS PRN
Status: ACTIVE | OUTPATIENT
Start: 2025-06-26 | End: 2025-06-27

## 2025-06-26 RX ORDER — POLYETHYLENE GLYCOL 3350 17 G/17G
1 POWDER, FOR SOLUTION ORAL DAILY
Status: DISCONTINUED | OUTPATIENT
Start: 2025-06-27 | End: 2025-06-29 | Stop reason: HOSPADM

## 2025-06-26 RX ORDER — OXYCODONE HYDROCHLORIDE 10 MG/1
10 TABLET ORAL EVERY 4 HOURS PRN
Status: DISPENSED | OUTPATIENT
Start: 2025-06-26 | End: 2025-06-27

## 2025-06-26 RX ORDER — DOCUSATE SODIUM 100 MG/1
100 CAPSULE, LIQUID FILLED ORAL 2 TIMES DAILY PRN
Status: DISCONTINUED | OUTPATIENT
Start: 2025-06-26 | End: 2025-06-29 | Stop reason: HOSPADM

## 2025-06-26 RX ORDER — CEFAZOLIN SODIUM 1 G/3ML
2 INJECTION, POWDER, FOR SOLUTION INTRAMUSCULAR; INTRAVENOUS ONCE
Status: DISCONTINUED | OUTPATIENT
Start: 2025-06-26 | End: 2025-06-26

## 2025-06-26 RX ORDER — CEFAZOLIN SODIUM 1 G/3ML
INJECTION, POWDER, FOR SOLUTION INTRAMUSCULAR; INTRAVENOUS PRN
Status: DISCONTINUED | OUTPATIENT
Start: 2025-06-26 | End: 2025-06-26 | Stop reason: SURG

## 2025-06-26 RX ORDER — ALBUTEROL SULFATE 5 MG/ML
2.5 SOLUTION RESPIRATORY (INHALATION)
Status: DISCONTINUED | OUTPATIENT
Start: 2025-06-26 | End: 2025-06-26 | Stop reason: HOSPADM

## 2025-06-26 RX ORDER — ALBUTEROL SULFATE 90 UG/1
2 INHALANT RESPIRATORY (INHALATION) EVERY 6 HOURS PRN
Status: DISCONTINUED | OUTPATIENT
Start: 2025-06-26 | End: 2025-06-26

## 2025-06-26 RX ORDER — DIPHENHYDRAMINE HYDROCHLORIDE 50 MG/ML
12.5 INJECTION, SOLUTION INTRAMUSCULAR; INTRAVENOUS
Status: DISCONTINUED | OUTPATIENT
Start: 2025-06-26 | End: 2025-06-26 | Stop reason: HOSPADM

## 2025-06-26 RX ORDER — OXYCODONE HCL 5 MG/5 ML
10 SOLUTION, ORAL ORAL
Status: COMPLETED | OUTPATIENT
Start: 2025-06-26 | End: 2025-06-26

## 2025-06-26 RX ORDER — MEPERIDINE HYDROCHLORIDE 50 MG/ML
6.25 INJECTION INTRAMUSCULAR; INTRAVENOUS; SUBCUTANEOUS
Status: DISCONTINUED | OUTPATIENT
Start: 2025-06-26 | End: 2025-06-26 | Stop reason: HOSPADM

## 2025-06-26 RX ORDER — DIPHENHYDRAMINE HCL 25 MG
25 TABLET ORAL EVERY 6 HOURS PRN
Status: DISCONTINUED | OUTPATIENT
Start: 2025-06-27 | End: 2025-06-29 | Stop reason: HOSPADM

## 2025-06-26 RX ORDER — SODIUM CHLORIDE 9 MG/ML
INJECTION, SOLUTION INTRAVENOUS ONCE
Status: DISCONTINUED | OUTPATIENT
Start: 2025-06-26 | End: 2025-06-29 | Stop reason: HOSPADM

## 2025-06-26 RX ORDER — EPHEDRINE SULFATE 50 MG/ML
5 INJECTION, SOLUTION INTRAVENOUS
Status: DISCONTINUED | OUTPATIENT
Start: 2025-06-26 | End: 2025-06-26 | Stop reason: HOSPADM

## 2025-06-26 RX ORDER — HYDROMORPHONE HYDROCHLORIDE 1 MG/ML
0.4 INJECTION, SOLUTION INTRAMUSCULAR; INTRAVENOUS; SUBCUTANEOUS
Status: DISCONTINUED | OUTPATIENT
Start: 2025-06-26 | End: 2025-06-26 | Stop reason: HOSPADM

## 2025-06-26 RX ORDER — MISOPROSTOL 200 UG/1
800 TABLET ORAL
Status: DISCONTINUED | OUTPATIENT
Start: 2025-06-26 | End: 2025-06-29 | Stop reason: HOSPADM

## 2025-06-26 RX ORDER — SODIUM CHLORIDE, SODIUM GLUCONATE, SODIUM ACETATE, POTASSIUM CHLORIDE AND MAGNESIUM CHLORIDE 526; 502; 368; 37; 30 MG/100ML; MG/100ML; MG/100ML; MG/100ML; MG/100ML
1000 INJECTION, SOLUTION INTRAVENOUS ONCE
Status: COMPLETED | OUTPATIENT
Start: 2025-06-26 | End: 2025-06-26

## 2025-06-26 RX ORDER — BUPIVACAINE HYDROCHLORIDE 7.5 MG/ML
INJECTION, SOLUTION INTRASPINAL
Status: COMPLETED | OUTPATIENT
Start: 2025-06-26 | End: 2025-06-26

## 2025-06-26 RX ORDER — SODIUM CHLORIDE, SODIUM LACTATE, POTASSIUM CHLORIDE, CALCIUM CHLORIDE 600; 310; 30; 20 MG/100ML; MG/100ML; MG/100ML; MG/100ML
INJECTION, SOLUTION INTRAVENOUS CONTINUOUS
Status: DISCONTINUED | OUTPATIENT
Start: 2025-06-26 | End: 2025-06-29 | Stop reason: HOSPADM

## 2025-06-26 RX ORDER — HYDROMORPHONE HYDROCHLORIDE 1 MG/ML
0.2 INJECTION, SOLUTION INTRAMUSCULAR; INTRAVENOUS; SUBCUTANEOUS
Status: DISCONTINUED | OUTPATIENT
Start: 2025-06-26 | End: 2025-06-26 | Stop reason: HOSPADM

## 2025-06-26 RX ORDER — ACETAMINOPHEN 500 MG
1000 TABLET ORAL EVERY 6 HOURS
Status: DISCONTINUED | OUTPATIENT
Start: 2025-06-26 | End: 2025-06-26

## 2025-06-26 RX ORDER — CITRIC ACID/SODIUM CITRATE 334-500MG
30 SOLUTION, ORAL ORAL ONCE
Status: COMPLETED | OUTPATIENT
Start: 2025-06-26 | End: 2025-06-26

## 2025-06-26 RX ORDER — DIPHENHYDRAMINE HYDROCHLORIDE 50 MG/ML
25 INJECTION, SOLUTION INTRAMUSCULAR; INTRAVENOUS EVERY 6 HOURS PRN
Status: DISCONTINUED | OUTPATIENT
Start: 2025-06-27 | End: 2025-06-29 | Stop reason: HOSPADM

## 2025-06-26 RX ORDER — HALOPERIDOL 5 MG/ML
1 INJECTION INTRAMUSCULAR
Status: DISCONTINUED | OUTPATIENT
Start: 2025-06-26 | End: 2025-06-26 | Stop reason: HOSPADM

## 2025-06-26 RX ORDER — ACETAMINOPHEN 500 MG
1000 TABLET ORAL EVERY 6 HOURS PRN
Status: DISCONTINUED | OUTPATIENT
Start: 2025-06-30 | End: 2025-06-29 | Stop reason: HOSPADM

## 2025-06-26 RX ORDER — HYDROMORPHONE HYDROCHLORIDE 1 MG/ML
0.1 INJECTION, SOLUTION INTRAMUSCULAR; INTRAVENOUS; SUBCUTANEOUS
Status: DISCONTINUED | OUTPATIENT
Start: 2025-06-26 | End: 2025-06-26 | Stop reason: HOSPADM

## 2025-06-26 RX ORDER — OXYCODONE HYDROCHLORIDE 5 MG/1
5 TABLET ORAL EVERY 4 HOURS PRN
Status: DISCONTINUED | OUTPATIENT
Start: 2025-06-27 | End: 2025-06-29 | Stop reason: HOSPADM

## 2025-06-26 RX ORDER — OXYCODONE HYDROCHLORIDE 10 MG/1
10 TABLET ORAL EVERY 4 HOURS PRN
Status: DISCONTINUED | OUTPATIENT
Start: 2025-06-27 | End: 2025-06-29 | Stop reason: HOSPADM

## 2025-06-26 RX ORDER — ACETAMINOPHEN 500 MG
1000 TABLET ORAL EVERY 6 HOURS
Status: DISCONTINUED | OUTPATIENT
Start: 2025-06-26 | End: 2025-06-29 | Stop reason: HOSPADM

## 2025-06-26 RX ORDER — SODIUM CHLORIDE, SODIUM LACTATE, POTASSIUM CHLORIDE, CALCIUM CHLORIDE 600; 310; 30; 20 MG/100ML; MG/100ML; MG/100ML; MG/100ML
2000 INJECTION, SOLUTION INTRAVENOUS PRN
Status: DISCONTINUED | OUTPATIENT
Start: 2025-06-26 | End: 2025-06-29 | Stop reason: HOSPADM

## 2025-06-26 RX ORDER — ONDANSETRON 2 MG/ML
4 INJECTION INTRAMUSCULAR; INTRAVENOUS
Status: DISCONTINUED | OUTPATIENT
Start: 2025-06-26 | End: 2025-06-26 | Stop reason: HOSPADM

## 2025-06-26 RX ORDER — MORPHINE SULFATE 0.5 MG/ML
INJECTION, SOLUTION EPIDURAL; INTRATHECAL; INTRAVENOUS
Status: COMPLETED | OUTPATIENT
Start: 2025-06-26 | End: 2025-06-26

## 2025-06-26 RX ORDER — DIPHENHYDRAMINE HYDROCHLORIDE 50 MG/ML
INJECTION, SOLUTION INTRAMUSCULAR; INTRAVENOUS PRN
Status: DISCONTINUED | OUTPATIENT
Start: 2025-06-26 | End: 2025-06-26 | Stop reason: SURG

## 2025-06-26 RX ORDER — LEVOTHYROXINE SODIUM 75 UG/1
75 TABLET ORAL
Status: DISCONTINUED | OUTPATIENT
Start: 2025-06-27 | End: 2025-06-29 | Stop reason: HOSPADM

## 2025-06-26 RX ORDER — DEXAMETHASONE SODIUM PHOSPHATE 4 MG/ML
INJECTION, SOLUTION INTRA-ARTICULAR; INTRALESIONAL; INTRAMUSCULAR; INTRAVENOUS; SOFT TISSUE PRN
Status: DISCONTINUED | OUTPATIENT
Start: 2025-06-26 | End: 2025-06-26 | Stop reason: SURG

## 2025-06-26 RX ORDER — ONDANSETRON 2 MG/ML
4 INJECTION INTRAMUSCULAR; INTRAVENOUS EVERY 6 HOURS PRN
Status: ACTIVE | OUTPATIENT
Start: 2025-06-26 | End: 2025-06-27

## 2025-06-26 RX ORDER — EPHEDRINE SULFATE 50 MG/ML
10 INJECTION, SOLUTION INTRAVENOUS
Status: ACTIVE | OUTPATIENT
Start: 2025-06-26 | End: 2025-06-27

## 2025-06-26 RX ORDER — ONDANSETRON 2 MG/ML
4 INJECTION INTRAMUSCULAR; INTRAVENOUS EVERY 6 HOURS PRN
Status: DISCONTINUED | OUTPATIENT
Start: 2025-06-27 | End: 2025-06-29 | Stop reason: HOSPADM

## 2025-06-26 RX ORDER — METRONIDAZOLE 500 MG/1
500 TABLET ORAL EVERY 12 HOURS
Status: COMPLETED | OUTPATIENT
Start: 2025-06-26 | End: 2025-06-28

## 2025-06-26 RX ORDER — MIDAZOLAM HYDROCHLORIDE 1 MG/ML
INJECTION INTRAMUSCULAR; INTRAVENOUS PRN
Status: DISCONTINUED | OUTPATIENT
Start: 2025-06-26 | End: 2025-06-26 | Stop reason: SURG

## 2025-06-26 RX ORDER — METHYLERGONOVINE MALEATE 0.2 MG/ML
0.2 INJECTION INTRAVENOUS
Status: DISCONTINUED | OUTPATIENT
Start: 2025-06-26 | End: 2025-06-29 | Stop reason: HOSPADM

## 2025-06-26 RX ORDER — METOCLOPRAMIDE HYDROCHLORIDE 5 MG/ML
10 INJECTION INTRAMUSCULAR; INTRAVENOUS ONCE
Status: COMPLETED | OUTPATIENT
Start: 2025-06-26 | End: 2025-06-26

## 2025-06-26 RX ORDER — DIPHENHYDRAMINE HYDROCHLORIDE 50 MG/ML
12.5 INJECTION, SOLUTION INTRAMUSCULAR; INTRAVENOUS EVERY 6 HOURS PRN
Status: ACTIVE | OUTPATIENT
Start: 2025-06-26 | End: 2025-06-27

## 2025-06-26 RX ORDER — ONDANSETRON 4 MG/1
4 TABLET, ORALLY DISINTEGRATING ORAL EVERY 6 HOURS PRN
Status: DISCONTINUED | OUTPATIENT
Start: 2025-06-27 | End: 2025-06-29 | Stop reason: HOSPADM

## 2025-06-26 RX ORDER — LEVOTHYROXINE SODIUM 75 UG/1
75 TABLET ORAL
Status: DISCONTINUED | OUTPATIENT
Start: 2025-06-26 | End: 2025-06-26

## 2025-06-26 RX ORDER — CEPHALEXIN 500 MG/1
500 CAPSULE ORAL EVERY 6 HOURS
Status: COMPLETED | OUTPATIENT
Start: 2025-06-27 | End: 2025-06-28

## 2025-06-26 RX ORDER — TRANEXAMIC ACID 100 MG/ML
INJECTION, SOLUTION INTRAVENOUS PRN
Status: DISCONTINUED | OUTPATIENT
Start: 2025-06-26 | End: 2025-06-26 | Stop reason: SURG

## 2025-06-26 RX ORDER — CEFAZOLIN SODIUM 1 G/3ML
3 INJECTION, POWDER, FOR SOLUTION INTRAMUSCULAR; INTRAVENOUS ONCE
Status: DISCONTINUED | OUTPATIENT
Start: 2025-06-26 | End: 2025-06-26 | Stop reason: HOSPADM

## 2025-06-26 RX ORDER — ONDANSETRON 2 MG/ML
INJECTION INTRAMUSCULAR; INTRAVENOUS PRN
Status: DISCONTINUED | OUTPATIENT
Start: 2025-06-26 | End: 2025-06-26 | Stop reason: SURG

## 2025-06-26 RX ORDER — CARBOPROST TROMETHAMINE 250 UG/ML
250 INJECTION, SOLUTION INTRAMUSCULAR
Status: DISCONTINUED | OUTPATIENT
Start: 2025-06-26 | End: 2025-06-29 | Stop reason: HOSPADM

## 2025-06-26 RX ORDER — ALBUTEROL SULFATE 90 UG/1
2 INHALANT RESPIRATORY (INHALATION) EVERY 6 HOURS PRN
Status: DISCONTINUED | OUTPATIENT
Start: 2025-06-26 | End: 2025-06-29 | Stop reason: HOSPADM

## 2025-06-26 RX ORDER — OXYCODONE HCL 5 MG/5 ML
5 SOLUTION, ORAL ORAL
Status: COMPLETED | OUTPATIENT
Start: 2025-06-26 | End: 2025-06-26

## 2025-06-26 RX ORDER — HYDRALAZINE HYDROCHLORIDE 20 MG/ML
5 INJECTION INTRAMUSCULAR; INTRAVENOUS
Status: DISCONTINUED | OUTPATIENT
Start: 2025-06-26 | End: 2025-06-26 | Stop reason: HOSPADM

## 2025-06-26 RX ORDER — OXYTOCIN 10 [USP'U]/ML
10 INJECTION, SOLUTION INTRAMUSCULAR; INTRAVENOUS
Status: DISCONTINUED | OUTPATIENT
Start: 2025-06-26 | End: 2025-06-29 | Stop reason: HOSPADM

## 2025-06-26 RX ORDER — LABETALOL HYDROCHLORIDE 5 MG/ML
5 INJECTION, SOLUTION INTRAVENOUS
Status: DISCONTINUED | OUTPATIENT
Start: 2025-06-26 | End: 2025-06-26 | Stop reason: HOSPADM

## 2025-06-26 RX ADMIN — FAMOTIDINE 20 MG: 10 INJECTION, SOLUTION INTRAVENOUS at 18:00

## 2025-06-26 RX ADMIN — FENTANYL CITRATE 15 MCG: 50 INJECTION, SOLUTION INTRAMUSCULAR; INTRAVENOUS at 18:36

## 2025-06-26 RX ADMIN — CEFAZOLIN 3 G: 1 INJECTION, POWDER, FOR SOLUTION INTRAMUSCULAR; INTRAVENOUS at 18:38

## 2025-06-26 RX ADMIN — MIDAZOLAM HYDROCHLORIDE 2 MG: 1 INJECTION, SOLUTION INTRAMUSCULAR; INTRAVENOUS at 19:17

## 2025-06-26 RX ADMIN — Medication 25 MG: at 19:28

## 2025-06-26 RX ADMIN — TRANEXAMIC ACID 1000 MG: 100 INJECTION, SOLUTION INTRAVENOUS at 19:38

## 2025-06-26 RX ADMIN — DEXAMETHASONE SODIUM PHOSPHATE 4 MG: 4 INJECTION INTRA-ARTICULAR; INTRALESIONAL; INTRAMUSCULAR; INTRAVENOUS; SOFT TISSUE at 19:07

## 2025-06-26 RX ADMIN — Medication 25 MG: at 19:38

## 2025-06-26 RX ADMIN — MORPHINE SULFATE 150 MCG: 0.5 INJECTION, SOLUTION EPIDURAL; INTRATHECAL; INTRAVENOUS at 18:36

## 2025-06-26 RX ADMIN — OXYTOCIN 10 UNITS: 10 INJECTION, SOLUTION INTRAMUSCULAR; INTRAVENOUS at 19:01

## 2025-06-26 RX ADMIN — ONDANSETRON 4 MG: 2 INJECTION INTRAMUSCULAR; INTRAVENOUS at 18:26

## 2025-06-26 RX ADMIN — SODIUM CITRATE AND CITRIC ACID MONOHYDRATE: 2004; 3000 SOLUTION ORAL at 17:58

## 2025-06-26 RX ADMIN — PROPOFOL 50 MG: 10 INJECTION, EMULSION INTRAVENOUS at 19:22

## 2025-06-26 RX ADMIN — METOCLOPRAMIDE HYDROCHLORIDE 10 MG: 5 INJECTION INTRAMUSCULAR; INTRAVENOUS at 17:58

## 2025-06-26 RX ADMIN — BUPIVACAINE HYDROCHLORIDE IN DEXTROSE 1.4 ML: 7.5 INJECTION, SOLUTION SUBARACHNOID at 18:36

## 2025-06-26 RX ADMIN — ACETAMINOPHEN 1000 MG: 500 TABLET ORAL at 23:59

## 2025-06-26 RX ADMIN — TRANEXAMIC ACID 1000 MG: 100 INJECTION, SOLUTION INTRAVENOUS at 19:06

## 2025-06-26 RX ADMIN — DIPHENHYDRAMINE HYDROCHLORIDE 25 MG: 50 INJECTION, SOLUTION INTRAMUSCULAR; INTRAVENOUS at 20:11

## 2025-06-26 RX ADMIN — Medication 25 MG: at 19:17

## 2025-06-26 RX ADMIN — PHENYLEPHRINE HYDROCHLORIDE 50 MCG/MIN: 10 INJECTION INTRAVENOUS at 18:36

## 2025-06-26 RX ADMIN — OXYCODONE HYDROCHLORIDE 10 MG: 5 SOLUTION ORAL at 21:06

## 2025-06-26 RX ADMIN — CEPHALEXIN 500 MG: 500 CAPSULE ORAL at 23:59

## 2025-06-26 RX ADMIN — Medication 25 MG: at 19:14

## 2025-06-26 RX ADMIN — METRONIDAZOLE 500 MG: 500 TABLET ORAL at 23:59

## 2025-06-26 RX ADMIN — SODIUM CHLORIDE, SODIUM GLUCONATE, SODIUM ACETATE, POTASSIUM CHLORIDE AND MAGNESIUM CHLORIDE: 526; 502; 368; 37; 30 INJECTION, SOLUTION INTRAVENOUS at 18:15

## 2025-06-26 RX ADMIN — OXYTOCIN 125 ML/HR: 10 INJECTION, SOLUTION INTRAMUSCULAR; INTRAVENOUS at 21:11

## 2025-06-26 SDOH — ECONOMIC STABILITY: TRANSPORTATION INSECURITY
IN THE PAST 12 MONTHS, HAS LACK OF RELIABLE TRANSPORTATION KEPT YOU FROM MEDICAL APPOINTMENTS, MEETINGS, WORK OR FROM GETTING THINGS NEEDED FOR DAILY LIVING?: NO

## 2025-06-26 SDOH — ECONOMIC STABILITY: TRANSPORTATION INSECURITY
IN THE PAST 12 MONTHS, HAS THE LACK OF TRANSPORTATION KEPT YOU FROM MEDICAL APPOINTMENTS OR FROM GETTING MEDICATIONS?: NO

## 2025-06-26 ASSESSMENT — PATIENT HEALTH QUESTIONNAIRE - PHQ9
2. FEELING DOWN, DEPRESSED, IRRITABLE, OR HOPELESS: NOT AT ALL
SUM OF ALL RESPONSES TO PHQ9 QUESTIONS 1 AND 2: 0
1. LITTLE INTEREST OR PLEASURE IN DOING THINGS: NOT AT ALL

## 2025-06-26 ASSESSMENT — LIFESTYLE VARIABLES
ALCOHOL_USE: NO
HAVE YOU EVER FELT YOU SHOULD CUT DOWN ON YOUR DRINKING: NO
HAVE PEOPLE ANNOYED YOU BY CRITICIZING YOUR DRINKING: NO

## 2025-06-26 ASSESSMENT — PAIN DESCRIPTION - PAIN TYPE: TYPE: SURGICAL PAIN

## 2025-06-26 ASSESSMENT — SOCIAL DETERMINANTS OF HEALTH (SDOH)
WITHIN THE PAST 12 MONTHS, YOU WORRIED THAT YOUR FOOD WOULD RUN OUT BEFORE YOU GOT THE MONEY TO BUY MORE: NEVER TRUE
WITHIN THE LAST YEAR, HAVE TO BEEN RAPED OR FORCED TO HAVE ANY KIND OF SEXUAL ACTIVITY BY YOUR PARTNER OR EX-PARTNER?: NO
WITHIN THE LAST YEAR, HAVE YOU BEEN HUMILIATED OR EMOTIONALLY ABUSED IN OTHER WAYS BY YOUR PARTNER OR EX-PARTNER?: NO
WITHIN THE PAST 12 MONTHS, THE FOOD YOU BOUGHT JUST DIDN'T LAST AND YOU DIDN'T HAVE MONEY TO GET MORE: NEVER TRUE
WITHIN THE LAST YEAR, HAVE YOU BEEN KICKED, HIT, SLAPPED, OR OTHERWISE PHYSICALLY HURT BY YOUR PARTNER OR EX-PARTNER?: NO
IN THE PAST 12 MONTHS, HAS THE ELECTRIC, GAS, OIL, OR WATER COMPANY THREATENED TO SHUT OFF SERVICE IN YOUR HOME?: NO
WITHIN THE LAST YEAR, HAVE YOU BEEN AFRAID OF YOUR PARTNER OR EX-PARTNER?: NO

## 2025-06-26 ASSESSMENT — FIBROSIS 4 INDEX: FIB4 SCORE: 0.48

## 2025-06-26 NOTE — ED PROVIDER NOTES
LABOR & DELIVERY TRIAGE HISTORY AND PHYSICAL  Patient Name: Kelli Doherty Age/Sex: 35 y.o. female  : 1990 MRN: [id number]      HISTORY OF PRESENT ILLNESS:   Kelli Doherty is a 35 y.o.  at 39w2d weeks gestation by LMP equal to 8w1d ultrasound who is here for increasing contractions.     Last 1-2 days increased strength and frquencu contractions. Yesterday q30min, this morning ~2am, started every 15 minutes and more painful. Denies LOF. Mild spotting. +FM. Has scheduled rLTCS this , but lives in [place] so worried she would not make it to scheduled date.     Patient states last episode of intercourse was yesterday.    Her EDC is 2025, by Ultrasound.   She has received prenatal care with [facility].  Complications during pregnancy:   -Asthma   -Hypothyroidism (on 75mcg Synthroid)  -Hx CS x3    History of STD: teen chlamydia     OBSTETRICAL HISTORY:    OB History    Para Term  AB Living   8 3 2 1 4 3   SAB IAB Ectopic Molar Multiple Live Births   2 2    3      # Outcome Date GA Lbr Derek/2nd Weight Sex Type Anes PTL Lv   8 Current            7 SAB  6w0d             Birth Comments: Pt states passed on its own   6 SAB  7w0d             Birth Comments: Pt states passed on its own   5  17 35w0d  2.722 kg (6 lb) M CS-LTranv Spinal Y ROSMERY      Birth Comments: Repeat C/S elevated blood pressures per pt 210/120   4 IAB  5w0d             Birth Comments: Pt states no complications.   3 Term 05/26/10 38w0d  3.232 kg (7 lb 2 oz) M CS-LTranv Spinal N ROSMERY      Birth Comments: Repeat C/S   2 Term 08 39w0d  2.948 kg (6 lb 8 oz) M CS-LTranv Spinal N ROSMERY      Birth Comments: primary C/S IUGR failed induction secondary to fetal intolerance Occiput transverse   1 IAB  5w0d             Birth Comments: Pt states no complications       MEDICAL HISTORY:    Past Medical History[1]    SURGICAL HISTORY:    Past Surgical History[2]    MEDICATIONS:    Prescriptions  "Prior to Admission[3]    ALLERGIES:    Allergies[4]     SOCIAL HISTORY:      reports that she quit smoking about 9 years ago. Her smoking use included cigarettes. She started smoking about 15 years ago. She has a 1.5 pack-year smoking history. She has never used smokeless tobacco. She reports that she does not currently use drugs after having used the following drugs: Marijuana. She reports that she does not drink alcohol.    FAMILY HISTORY:    Family History   Problem Relation Age of Onset    Hypertension Mother     Hyperlipidemia Mother     Thyroid Mother     Other Mother         thyroid    Alcohol abuse Father     Hypertension Father     Diabetes Father     Alzheimer's Disease Maternal Grandmother     Cancer Maternal Grandfather         melanoma stage 4    Cancer Paternal Grandmother         lung    Diabetes Paternal Grandfather        REVIEW OF SYSTEMS:  Pertinent items are noted in HPI.      PHYSICAL EXAM:    Vitals:    25 1306   BP: 123/80   Pulse: (!) 101   Resp: 18   Temp: (!) 35.7 °C (96.3 °F)   TempSrc: Temporal   SpO2: 96%   Weight: 122 kg (270 lb)   Height: 1.499 m (4' 11\")     Temp (24hrs), Av.7 °C (96.3 °F), Min:35.7 °C (96.3 °F), Max:35.7 °C (96.3 °F)    General: No acute distress, resting comfortably in bed.  HEENT: normocephalic, nontraumatic, PERRLA, EOMI  Cardiovascular: Heart RRR with no murmurs, rubs or gallops. Distal Pulses 2+  Respiratory: symmetric chest expansion, lungs CTAB, with no wheezes, rales, rhonci  Abdomen: gravid, nontender  Musculoskeletal: [place]FIELD spontaneously  Neuro: non focal with no numbness, tingling or changes in sensation    Cervix:  Closed/thick/high  [place]: Uterine Contractions Q5 minutes.   FHRM: Baseline 150, moderate variability, + accels, no decels    Prenatal Labs:  HepBSAg NR  HIV NR  RPR NR  Rubella immune  ABO: AB+    Group B Strep: negative      ASSESSMENT/ PLAN:   Kelli Doherty is a 35 y.o.  at 39w2d weeks gestation by LMP equal to 8w1d " ultrasound who is here for increasing contractions. .      Increased very painful contractions felt q15min, but ~5min on tocometer. Closed cervix. Reassuring FHT, Cat 1. No LoF. +FM. Mild spotting. Originally scheduled  for rLTCS (hx 3x CS in past), but with patient discomfort, increasingly frequent contractions, and her living in [place], will admit for rLTCS today.     Please see H&P      Discussed case with Dr. Geronimo, [facility] Attending. Case was discussed and attending agreed with plan prior to admission    No follow-up provider specified.     Future Appointments   Date Time Provider Department Center   2025  1:00 PM Alexys Celeste M.D. [facility] PATRICK Terrell M.D.  PGY-1 Resident   [facility]          [1]   Past Medical History:  Diagnosis Date    Allergy     Anemia     Anesthesia 2025    PONV, pt with history of motion sickness    Asthma 2025    inhaler prn    Back pain     Breath shortness 2025    with pregnancy    Bronchitis     Chickenpox     Cough     Depression 2025    with anxiety, not medicated at present    Head ache     Hypertension 2017    with preeclampsia with prior pregnancies    Influenza     Migraine     Pain 2025    contractions    PONV (postoperative nausea and vomiting) 2025    pt with history of motion sickness    Pregnant 2025    at present    Thyroid disease 2025    medicated    Wheezing    [2]   Past Surgical History:  Procedure Laterality Date    OTHER ORTHOPEDIC SURGERY Right 2025    Ankle times 2    GYN SURGERY  2025    endometrial mass removed    REPEAT C SECTION  2025    times 1    GA COLONOSCOPY-FLEXIBLE  2020    Procedure: COLONOSCOPY;  Surgeon: Marck Mcconnell M.D.;  Location: SURGERY [facility];  Service: Gastroenterology    OTHER ABDOMINAL SURGERY      galbladder removal    GYN SURGERY           PRIMARY C SECTION     [3]   Medications Prior to Admission  "  Medication Sig Dispense Refill Last Dose/Taking    aspirin 81 MG EC tablet Take 81 mg by mouth every day. (Patient not taking: No sig reported)       Prenatal MV-Min-Fe Fum-FA-DHA (PRENATAL 1 PO) Take  by mouth every day.       levothyroxine (SYNTHROID) 75 MCG Tab Take 1 Tablet by mouth every morning on an empty stomach. 30 Tablet 5     albuterol 108 (90 Base) MCG/ACT Aero Soln inhalation aerosol Inhale 2 Puffs by mouth every 6 hours as needed for Shortness of Breath. (Patient taking differently: Inhale 2 Puffs every 6 hours as needed for Shortness of Breath. Pt still has inhaler and uses prn) 8.5 g 0    [4]   Allergies  Allergen Reactions    Iodine Hives and Rash     Blisters    Aleve [Naproxen Sodium] Rash     \"rash\"    Betadine [Povidone Iodine] Rash     \"rash, hives\"    Latex Rash     Rash    Sulfamethoxazole W-Trimethoprim Vomiting     \"vomiting\"     "

## 2025-06-26 NOTE — TELEPHONE ENCOUNTER
Caller Name: Kelli Doherty    Call Back Number: [phone]       Pt called stating is having contractions every 15 minutes lasting about a minute for about 3 days . Denies LOF,VB, is having some spotting. Pt is worried since she lives in [facility] . I consulted with Valdemar JEAN and stated she should not go until contractions are 5-6 minutes apart and having heavy bleeding like a period , Decrease FM, or her water breaking . I let pt know this information and pt stated she is not willing to wait since she does live in [facility] and is having a C/S 6/28/2025 pt is heading to L&D. Call ended.

## 2025-06-26 NOTE — H&P
OB H&P:    CC: contractions    HPI:  Ms. Kelli Doherty is a 35 y.o.  at 39w2d weeks gestation by LMP equal to 8w1d ultrasound who is here for increasing contractions.     Last 1-2 days increased strength and frquencu contractions. Yesterday q30min, this morning ~2am, started every 15 minutes and more painful. Denies LOF. Mild spotting. +FM. Has scheduled rLTCS this , but lives in [place] so worried she would not make it to scheduled date.      Patient states last episode of intercourse was yesterday.      Prenatal Care with:  [facility]    Complications this pregnancy:  -Asthma   -Hypothyroidism (on 75mcg Synthroid)  -Hx CS x3  -Obesity (BMI 54)    Prenatal Labs:  AB+, Rubella Immune, HIV neg, TrepAb neg, HBsAg NR, GC/CT neg/neg  Glucola: normal  GBS negative      ROS:  Const: denies fevers, general concerns  CV/resp: reports no concerns  GI: denies abd pain, GI concerns  : see HPI  Neuro: denies HA/vision changes    OB History    Para Term  AB Living   8 3 2 1 4 3   SAB IAB Ectopic Molar Multiple Live Births   2 2    3      # Outcome Date GA Lbr Derek/2nd Weight Sex Type Anes PTL Lv   8 Current            7 2023 6w0d             Birth Comments: Pt states passed on its own   6 SAB  7w0d             Birth Comments: Pt states passed on its own   5  17 35w0d  2.722 kg (6 lb) M CS-LTranv Spinal Y ROSMERY      Birth Comments: Repeat C/S elevated blood pressures per pt 210/120   4 IAB  5w0d             Birth Comments: Pt states no complications.   3 Term 05/26/10 38w0d  3.232 kg (7 lb 2 oz) M CS-LTranv Spinal N ROSMERY      Birth Comments: Repeat C/S   2 Term 08 39w0d  2.948 kg (6 lb 8 oz) M CS-LTranv Spinal N ROSMERY      Birth Comments: primary C/S IUGR failed induction secondary to fetal intolerance Occiput transverse   1 IAB  5w0d             Birth Comments: Pt states no complications       GYN: denies STIs, no cervical procedures    Past Medical  "History[1]    Past Surgical History[2]    Medications Ordered Prior to Encounter[3]    Family History   Problem Relation Age of Onset    Hypertension Mother     Hyperlipidemia Mother     Thyroid Mother     Other Mother         thyroid    Alcohol abuse Father     Hypertension Father     Diabetes Father     Alzheimer's Disease Maternal Grandmother     Cancer Maternal Grandfather         melanoma stage 4    Cancer Paternal Grandmother         lung    Diabetes Paternal Grandfather        Social History[4]      PE:   Vitals:    25 1306   BP: 123/80   Pulse: (!) 101   Resp: 18   Temp: (!) 35.7 °C (96.3 °F)   TempSrc: Temporal   SpO2: 96%   Weight: 122 kg (270 lb)   Height: 1.499 m (4' 11\")     gen: AAO, NAD  abd: soft, gravid, NT, EFW (33w5d 21%)  Ext: NT, no pitting edema    Cervix:  Closed/thick/high  [place]:Uterine Contractions Q5 minutes.   FHRM: Baseline 150, moderate variability, + accels, no decels    A/P: 35 y.o.   at 39w2d weeks gestation by LMP equal to 8w1d ultrasound who is here for increasing contractions admitting for repeat LTCS + BTL    -Admit to Labor and Delivery  -Continuous external fetal monitoring and tocometer  -Plan for normal delivery via repeat  section  -CBC, COD    -Contraception: previously consented for BTL on 25    -Plan for VTE ppx with Lovenox 60mg postpartum     #Hypothyroidism  -Continue Synthroid 75mcg daily startign postpartum      Eligio Terrell MD  [facility]  PGY-1             [1]   Past Medical History:  Diagnosis Date    Allergy     Anemia     Anesthesia 2025    PONV, pt with history of motion sickness    Asthma 2025    inhaler prn    Back pain     Breath shortness 2025    with pregnancy    Bronchitis     Chickenpox     Cough     Depression 2025    with anxiety, not medicated at present    Head ache     Hypertension 2017    with preeclampsia with prior pregnancies    Influenza     Migraine     Pain 2025    contractions "    PONV (postoperative nausea and vomiting) 2025    pt with history of motion sickness    Pregnant 2025    at present    Thyroid disease 2025    medicated    Wheezing    [2]   Past Surgical History:  Procedure Laterality Date    OTHER ORTHOPEDIC SURGERY Right 2025    Ankle times 2    GYN SURGERY  2025    endometrial mass removed    REPEAT C SECTION  2025    times 1    MS COLONOSCOPY-FLEXIBLE  2020    Procedure: COLONOSCOPY;  Surgeon: Marck Mcconnell M.D.;  Location: SURGERY [facility];  Service: Gastroenterology    OTHER ABDOMINAL SURGERY      galbladder removal    GYN SURGERY           PRIMARY C SECTION     [3]   No current facility-administered medications on file prior to encounter.     Current Outpatient Medications on File Prior to Encounter   Medication Sig Dispense Refill    aspirin 81 MG EC tablet Take 81 mg by mouth every day. (Patient not taking: No sig reported)      Prenatal MV-Min-Fe Fum-FA-DHA (PRENATAL 1 PO) Take  by mouth every day.      levothyroxine (SYNTHROID) 75 MCG Tab Take 1 Tablet by mouth every morning on an empty stomach. 30 Tablet 5    albuterol 108 (90 Base) MCG/ACT Aero Soln inhalation aerosol Inhale 2 Puffs by mouth every 6 hours as needed for Shortness of Breath. (Patient taking differently: Inhale 2 Puffs every 6 hours as needed for Shortness of Breath. Pt still has inhaler and uses prn) 8.5 g 0   [4]   Social History  Tobacco Use    Smoking status: Former     Current packs/day: 0.00     Average packs/day: 0.3 packs/day for 6.2 years (1.5 ttl pk-yrs)     Types: Cigarettes     Start date: 2010     Quit date: 3/2/2016     Years since quittin.3    Smokeless tobacco: Never   Vaping Use    Vaping status: Never Used   Substance Use Topics    Alcohol use: No    Drug use: Not Currently     Types: Marijuana     Comment: last smoked ,

## 2025-06-26 NOTE — PROGRESS NOTES
"  [facility] - 37     Pt presents to L&D with c/o \"UCs.\" Pt states that she has been feeling intense contractions every 15 minutes today; pt endorses fetal movement, denies vaginal bleeding and leaking of fluid. EFM/TOCO applied, SVE by RN- closed.     1415- Dr Javier in room to see pt, order received to admit pt for surgery.     152- report to Анна CORTES  "

## 2025-06-27 LAB
ERYTHROCYTE [DISTWIDTH] IN BLOOD BY AUTOMATED COUNT: 46.9 FL (ref 35.9–50)
HCT VFR BLD AUTO: 34.7 % (ref 37–47)
HGB BLD-MCNC: 11.2 G/DL (ref 12–16)
MCH RBC QN AUTO: 27.1 PG (ref 27–33)
MCHC RBC AUTO-ENTMCNC: 32.3 G/DL (ref 32.2–35.5)
MCV RBC AUTO: 83.8 FL (ref 81.4–97.8)
PATHOLOGY CONSULT NOTE: NORMAL
PLATELET # BLD AUTO: 213 K/UL (ref 164–446)
PMV BLD AUTO: 12.2 FL (ref 9–12.9)
RBC # BLD AUTO: 4.14 M/UL (ref 4.2–5.4)
WBC # BLD AUTO: 20.3 K/UL (ref 4.8–10.8)

## 2025-06-27 PROCEDURE — 700102 HCHG RX REV CODE 250 W/ 637 OVERRIDE(OP): Performed by: STUDENT IN AN ORGANIZED HEALTH CARE EDUCATION/TRAINING PROGRAM

## 2025-06-27 PROCEDURE — 700102 HCHG RX REV CODE 250 W/ 637 OVERRIDE(OP)

## 2025-06-27 PROCEDURE — 85027 COMPLETE CBC AUTOMATED: CPT

## 2025-06-27 PROCEDURE — A9270 NON-COVERED ITEM OR SERVICE: HCPCS

## 2025-06-27 PROCEDURE — 700111 HCHG RX REV CODE 636 W/ 250 OVERRIDE (IP): Mod: JZ | Performed by: STUDENT IN AN ORGANIZED HEALTH CARE EDUCATION/TRAINING PROGRAM

## 2025-06-27 PROCEDURE — 36415 COLL VENOUS BLD VENIPUNCTURE: CPT

## 2025-06-27 PROCEDURE — A9270 NON-COVERED ITEM OR SERVICE: HCPCS | Performed by: STUDENT IN AN ORGANIZED HEALTH CARE EDUCATION/TRAINING PROGRAM

## 2025-06-27 PROCEDURE — 770002 HCHG ROOM/CARE - OB PRIVATE (112)

## 2025-06-27 RX ORDER — ENOXAPARIN SODIUM 100 MG/ML
40 INJECTION SUBCUTANEOUS DAILY
Status: DISCONTINUED | OUTPATIENT
Start: 2025-06-27 | End: 2025-06-29 | Stop reason: HOSPADM

## 2025-06-27 RX ADMIN — ACETAMINOPHEN 1000 MG: 500 TABLET ORAL at 17:59

## 2025-06-27 RX ADMIN — DOCUSATE SODIUM 100 MG: 100 CAPSULE, LIQUID FILLED ORAL at 09:48

## 2025-06-27 RX ADMIN — OXYCODONE HYDROCHLORIDE 10 MG: 10 TABLET ORAL at 17:22

## 2025-06-27 RX ADMIN — LEVOTHYROXINE SODIUM 75 MCG: 0.07 TABLET ORAL at 05:53

## 2025-06-27 RX ADMIN — OXYCODONE HYDROCHLORIDE 10 MG: 10 TABLET ORAL at 09:49

## 2025-06-27 RX ADMIN — CEPHALEXIN 500 MG: 500 CAPSULE ORAL at 05:53

## 2025-06-27 RX ADMIN — ACETAMINOPHEN 1000 MG: 500 TABLET ORAL at 12:26

## 2025-06-27 RX ADMIN — ENOXAPARIN SODIUM 40 MG: 100 INJECTION SUBCUTANEOUS at 17:58

## 2025-06-27 RX ADMIN — CEPHALEXIN 500 MG: 500 CAPSULE ORAL at 17:59

## 2025-06-27 RX ADMIN — METRONIDAZOLE 500 MG: 500 TABLET ORAL at 12:26

## 2025-06-27 RX ADMIN — CEPHALEXIN 500 MG: 500 CAPSULE ORAL at 12:26

## 2025-06-27 RX ADMIN — ACETAMINOPHEN 1000 MG: 500 TABLET ORAL at 05:53

## 2025-06-27 RX ADMIN — POLYETHYLENE GLYCOL 3350 1 PACKET: 17 POWDER, FOR SOLUTION ORAL at 05:53

## 2025-06-27 ASSESSMENT — PAIN DESCRIPTION - PAIN TYPE
TYPE: SURGICAL PAIN;ACUTE PAIN
TYPE: SURGICAL PAIN
TYPE: SURGICAL PAIN

## 2025-06-27 NOTE — ANESTHESIA TIME REPORT
Anesthesia Start and Stop Event Times       Date Time Event    6/26/2025 1755 Ready for Procedure     1815 Anesthesia Start     2017 Anesthesia Stop          Responsible Staff  06/26/25      Name Role Begin End    Ernesto Fay M.D. Anesth 1815 2017          Overtime Reason:  no overtime (within assigned shift)    Comments:

## 2025-06-27 NOTE — CARE PLAN
The patient is Stable - Low risk of patient condition declining or worsening    Shift Goals  Clinical Goals: Pain control, lochia remain WDL, VSS    Progress made toward(s) clinical / shift goals:  Patient pain well controlled with rest, repositioning, and medication as needed/scheduled. Ambulating, voiding, and tolerating PO well at this time. No s/sx of infection observed.       Problem: Altered physiologic condition related to postoperative  delivery  Goal: Patient physiologically stable as evidenced by normal lochia, palpable uterine involution and vital signs within normal limits  Outcome: Progressing     Problem: Potential for postpartum infection related to surgical incision, compromised uterine condition, urinary tract or respiratory compromise  Goal: Patient will be afebrile and free from signs and symptoms of infection  Outcome: Progressing     Problem: Alteration in comfort related to surgical incision and/or after birth pains  Goal: Patient is able to ambulate, care for self and infant with acceptable pain level  Outcome: Progressing  Goal: Patient verbalizes acceptable pain level  Outcome: Progressing       Patient is not progressing towards the following goals:

## 2025-06-27 NOTE — L&D DELIVERY NOTE
"[facility]  Hospitalist Progress Note   10/04/2020          Assessment and Plan:     Agapito Fairbanks is a 70 year old male with medical history of metastatic lung cancer, chronic hypoxic respiratory failure with oxygen dependency, dysphagia status post G-tube placement, cognitive impairment, recent hospital admission from 9/22-9/26 secondary to aspiration pneumonia and dislodged G-tube, readmitted on 10/1/2020 for hyponatremia     Acute on chronic hyponatremia multifactorial from SIADH.  History of chronic hyponatremia due to SIADH and baseline sodium ranges from 126-130.  However, his last sodium level when he left the hospital on 9/25 was 136.   Reports poor oral intake, increased free water flushes, has underlying malignancy   Creatinine 0.4.  Urine osmolality 602, urine sodium 92.  TSH of 10.68, normal free T4, a.m. cortisol 15.3.  Albumin 2.2, ALT AST within normal limits.  CK normal.  UA leukocyte esterase negative, nitrite negative, Specific gravity within normal limits.  Nephrology following, will plan 2% normal saline and every 4 hours sodium checks.  IV fluids discontinued this morning per discussion with nephrologist.  Switched to free water flushes for feeding tube to half-normal saline flushes.  Fluid restriction to 1200 mL/day. Appreciate comanagement.  Continue telemetry monitoring.     History of metastatic small cell cancer  History of laryngeal cancer  Bone metastasis with associated back pain  Patient is currently undergoing palliative chemotherapy with Keytruda Taxol and carboplatin-last treatment 9/16/2020 with Neulasta support  Continue PTA fentanyl patch and PRN oxycodone  [facility] oncology following, scheduled to see Dr. Mullins in clinic on 10/14/2020 with possible treatment.     Severe malnutrition  Dysphasia  SLP note from 9/24 \"For comfort/quality of life and practice swallowing, pt can have as much THIN water or ice chips as he wants throughout the day as " OB  Delivery Note    2025  Kelli Doherty  35 y.o.    Review the Delivery Report for details.     Gestational Age: 39w2d  /Para:   Labor Complications:    Estimated Blood Loss:   Delivery Blood Loss   Intrapartum & Postpartum: 25 1853 - 25    Delivery Admission: 25 1222 - 25         Intrapartum & Postpartum Delivery Admission    Est. Blood Loss Anesthesia 750 mL 750 mL    Total  750 mL 750 mL          Delivery Type: , Low Transverse  ROM to Delivery Time: rupture date, rupture time, delivery date, or delivery time have not been documented  [place] Sex: Female   Weight: 2.635 kg (5 lb 13 oz)   1 Minute 5 Minute 10 Minute   Apgar Totals: 8   9           Details    Pre-Op Diagnosis: 1. Intrauterine pregnancy at 39w2d  2. History of  section  3. Unremitting contractions  4. BMI > 50  5. Hypothyroidism  6. Desire for permanent sterilization   Delivery Justification Patient was admitted to Labor and Delivery at 39w2d for repeat    Post-Op Diagnosis: 1-6. Same  7. Viable female    Indications:  Repeat   Procedure: Repeat , Low Transverse via Low Transverse incision and bilateral salpingectomy   Staff Surgeon(s):  AYUSH Green M.D.  Anesthesiologist: Ernesto Fay M.D.  Circulator: Анна Johnson R.N.  Relief Circulator: Anabelle Pagan R.N.  Relief Scrub: Gold Garcia  Scrub Person: Sally SPANN&GURJIT Baby  Nurse: Eliud Dooley R.N.; Leticia Mauro R.N.   Anesthesia: Spinal   Findings: Female infant delivered, weighing 2.635 kg (5 lb 13 oz). Normal uterus, tubes, and ovaries.     Informed Consent:  The risks, benefits, complications, and alternatives were discussed with the patient. The patient understood that the risks of  section include, but are not limited to: injury to nearby structures or organs, infection, blood loss and possible need for  "long as he is upright and taking small sips. Continue oral cares with toothbrushing 3-5x per day. Continue all meds via PEG tube\"  He is on bolus tube feeds at home, continue tube feeds for now, nutrition following.  Switched free water to half-normal saline flushes.  We will have speech therapy reassessment, as patient would like to have regular diet [small amounts of oral intake but complaining of phlegm, no wheezing in lungs]  Nutrition following.    Anemia and thrombocytopenia: likely from chemotherapy, metastatic cancer.  Chronic anemia and thrombocytopenia.  Hemoglobin between 8-9 at baseline.  Platelets around 120,000.  Will monitor.    Chronic respiratory failure, on 3 L/min oxygen  Recent hospital admission for aspiration pneumonia  Per patient reports, his respiratory status seems to have been stable.    Coarse breath sounds, completed Augmentin 7-day course.  Continue albuterol inhalers as needed, Robitussin as needed    Hypothyroidism  Elevated TSH of 10.74, free T4 0.95.  Monitor thyroid function tests in 10 to 12 weeks.  Continue with prior to admission levothyroxine     Cognitive impairment  Continue prior to admission [facility]  Occupational Therapy for cognitive assessment, patient lives at home.     Depression  Continue prior to admission Zoloft     Decubitus pressure ulcer, present on admission  Coccyx: Unstageable, wound care following.    Physical deconditioning in the setting of malignancy.  Fall x 2 in the hospital  Recent hospital admission from 9/22-9/26 secondary to aspiration pneumonia and dislodged G-tube  On 10/2 patient was sitting in his chair, stood up without assistance with his walker and slid down to the floor.  Evaluated by house staff, no external injuries.  Again had a fall on 10/4 morning, unwitnessed evaluated by house staff  No injuries.  PT, OT rosalie, will request reassessment.     Diet:  Tube feeding with oral feeding for pleasure.  DVT Prophylaxis: Pneumatic Compression " transfusion, and potential need for more surgery including hysterectomy. The patient stated understanding and desired to proceed. All questions were answered. The site of surgery was properly noted and marked. The patient was identified as Kelli Doherty and the procedure verified as a  delivery. A Time Out was held and the above information confirmed.    Procedure Details:   The patient was taken to the operating room where spinal analgesia was administered and found to be adequate. A Fabian catheter was inserted with sterile technique. She was given 3g Ancef preoperatively for prophylaxis. She was placed in the supine position with a leftward tilt and SCD stockings were placed and a time-out was performed. She was then prepped and draped in the normal sterile fashion.      A low transverse Pfannenstiel skin incision was made using a scalpel approximately 2 cm above the symphysis pubis. This was carried down sharply to the underlying layer of fascia with the bovie. The fascia was nicked and transected sharply. The superior portion of the fascial incision was grasped with Kocher clamps and the visible rectus muscle tissue was dissected off. In a similar fashion, the inferior portion of the fascial incision was grasped and the visible rectus muscle tissue was dissected off.  The rectus muscles were  bluntly. The parietal peritoneum was entered bluntly and the peritoneal incision was extended by sharp dissection and blunt stretching. The Chace retractor was then inserted.     The lower uterine segment was incised in a transverse fashion with the scalpel. The uterine incision was extended bluntly. The infant's head delivered atraumatically followed by the remainder of the body with fundal pressure.   The cord clamped and cut after a delay of 30s and the infant was handed to pediatric staff. The placenta was then removed manually, the uterus cleared of all clots and debris. The uterine incision  Devices  Ocampo Catheter: not present  Code Status:  CPR okay, DNI per discussion with admitting hospitalist.     Discussed with patient, nephrologist, bedside RN    Elmo Nelson MD        Interval History:      Lying in bed.  Complains of generalized weakness and unwitnessed fall this morning.  Evaluated by house staff, no external injuries noted.  Patient complaining of phlegm, cough.  Continues to have IV fluids at 25 mL/h.  Patient would like to have order for regular diet, speech therapy during previous admission recommended small sips.   No nausea or vomiting.  Afebrile overnight.  No chest pain or palpitation.  No headache or dizziness.  No new tingling or numbness.         Physical Exam:        Physical Exam   Temp:  [95.2  F (35.1  C)-96.8  F (36  C)] 96.1  F (35.6  C)  Pulse:  [75-86] 75  Resp:  [15-16] 15  BP: ()/(57-67) 104/67  SpO2:  [95 %-100 %] 100 %    Intake/Output Summary (Last 24 hours) at 10/2/2020 0854  Last data filed at 10/2/2020 0430  Gross per 24 hour   Intake 710 ml   Output 200 ml   Net 510 ml       Admission Weight: 59.9 kg (132 lb)  Current Weight: 59.9 kg (132 lb)    PHYSICAL EXAM  GENERAL: Patient is in no distress. Alert and oriented, slow to answer to commands.  HEART: Regular rate and rhythm. S1S2. No murmurs  LUNGS: Bilateral slightly decreased breath sounds.  No wheezing.  Respirations unlabored  NEURO: Moving all extremities.  EXTREMITIES: No pedal edema.   SKIN: Warm, dry  PSYCHIATRY Cooperative       Medications:          cetirizine  10 mg Per G Tube Daily     donepezil  5 mg Oral or Feeding Tube Daily     fentaNYL  12 mcg Transdermal Q72H     fentaNYL   Transdermal Q8H     gabapentin  300 mg Oral TID     levothyroxine  112 mcg Oral or Feeding Tube Daily     sertraline  50 mg Oral or Feeding Tube At Bedtime     sodium chloride (PF)  3 mL Intracatheter Q8H     acetaminophen, albuterol, albuterol, diclofenac, fluticasone, lidocaine 4%, lidocaine (buffered or not  was repaired with 0 Vicryl in a running locked fashion. A second imbricating layer was placed in a horizontal fashion. A third imbricating layer was placed over the incision for continued ongoing oozing. Surgicel powder applied to the incision. The paracolic gutters were cleared of all clots. A final look at the uterine incision revealed excellent hemostasis. The Chace retractor was removed and the incision again visualized, revealing continued hemostasis.     The fascia was reapproximated with 0 PDS in a running fashion starting from both ends and meeting in the middle.  Irrigation of the subcutaneous tissue was performed. The subcutaneous fat was closed with 3-0 Vicryl. The skin was closed with a subcuticular stitch and a Prevena wound vac was applied. The patient tolerated the procedure well. Sponge, lap and needle counts were correct x2 and the patient was taken to the recovery room in stable condition.        Cristel Morris MD MPH       buffered), LORazepam, melatonin, naloxone, ondansetron **OR** ondansetron, oxyCODONE, polyethylene glycol, prochlorperazine **OR** prochlorperazine **OR** prochlorperazine, sodium chloride (PF)         Data:      All new lab and imaging data was reviewed.

## 2025-06-27 NOTE — CARE PLAN
Problem: Altered physiologic condition related to postoperative  delivery  Goal: Patient physiologically stable as evidenced by normal lochia, palpable uterine involution and vital signs within normal limits  Outcome: Progressing     Problem: Alteration in comfort related to surgical incision and/or after birth pains  Goal: Patient verbalizes acceptable pain level  Outcome: Progressing   The patient is Stable - Low risk of patient condition declining or worsening    Shift Goals: pain controlled, rest  Clinical Goals: maintain normal lochia, pain management  Patient Goals: pain controlled, rest  Family Goals: support    Progress made toward(s) clinical / shift goals:  pt verbalized acceptable level of pain    Patient is not progressing towards the following goals:

## 2025-06-27 NOTE — PROGRESS NOTES
1815: Pt to OR2  1901: Viable female per Dr. Morris, infant as singed 8/9 APGARS  1855: Report given to Sathish CORTES. Pt stable undergoing c/s

## 2025-06-27 NOTE — DISCHARGE PLANNING
Discharge Planning Assessment Post Partum    Reason for Referral: History of anxiety, depression, and THC   Address: [facility] [place] [address]  Phone: [phone]  Type of Living Situation: stable housing   Mom Diagnosis: Pregnancy,    Baby Diagnosis: -39.2 weeks   Primary Language: English     Name of Baby: Cherry Koehler (: 25)  Father of the Baby: John Koehler   Involved in baby’s care?  Yes    Prenatal Care: Yes-[facility] starting at 18 weeks   Mom's PCP: Dr. Satish Stephenson   PCP for new baby: Dr. 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  The report is information only.  Psychiatric History: History of anxiety and depression.  Discussed with mother and provided PPD/PPA resources.  Domestic Violence History: No  Drug/ETOH History: History of THC.  Infant's UDS is positive for THC.    Resources Provided: children and family community resource list, diaper bank referrals, list of WIC clinics, and PPD/PPA handout   Referrals Made: diaper bank referrals provided      Clearance for Discharge: Infant is cleared to discharge home with parents once medically cleared

## 2025-06-27 NOTE — ANESTHESIA PROCEDURE NOTES
Spinal Block    Date/Time: 6/26/2025 6:36 PM    Performed by: Ernesto Fay M.D.  Authorized by: Ernesto Fay M.D.    Patient Location:  OR  Start Time:  6/26/2025 6:36 PM  Reason for Block: primary anesthetic    patient identified, IV checked, site marked, risks and benefits discussed, surgical consent, monitors and equipment checked, pre-op evaluation and timeout performed    Patient Position:  Sitting  Prep: ChloraPrep, patient draped and sterile technique    Monitoring:  Blood pressure, continuous pulse oximetry and heart rate  Approach:  Midline  Location:  L3-4  Injection Technique:  Single-shot  Skin infiltration:  Lidocaine  Strength:  1%  Dose:  3ml  Needle Type:  Pencan  Needle Gauge:  25 G  CSF flowing pre/post injection:  Yes  Sensory Level:  T4   Used tuohy needle with ABA technique to identify epidural space. Pierced dura only with spinal needle

## 2025-06-27 NOTE — PROGRESS NOTES
PostpartumCS Progress Note   6/27/2025         S: Patient without complaints.  Pain is adequately controlled.  Patient is tolerating diet, ambulating and urinating without difficulty.  No CP/SOB.  No N/V.  Lochia appropriate.  Not yet passing flatus.       O:   Vitals:    06/27/25 0300 06/27/25 0400 06/27/25 0500 06/27/25 0600   BP:    121/66   Pulse: 83 89 85 75   Resp: 18 18 18 18   Temp:    36.6 °C (97.9 °F)   TempSrc:    Temporal   SpO2: 94% 94% 94% 94%   Weight:       Height:            GEN: NAD, speaking full sentences without distress  HEENT: NCAT, PERRLA, moist oral mucosa, anicteric, without pallor  CVS: Extremities warm and well perfused  LUNGS: Unlabored breathing  ABD: Soft, obese, appropriately tender, nondistended  Inc: Prevena wound vac in place with in tact seal  BACK: No CVAT  EXT: No cyanosis or edema  NEURO: No focal deficits    Labs:   Component      Latest Ref Rng 6/26/2025 6/27/2025   WBC      4.8 - 10.8 K/uL 16.4 (H)  20.3 (H)    RBC      4.20 - 5.40 M/uL 4.99  4.14 (L)    Hemoglobin      12.0 - 16.0 g/dL 13.7  11.2 (L)    Hematocrit      37.0 - 47.0 % 40.8  34.7 (L)    MCV      81.4 - 97.8 fL 81.8  83.8    MCH      27.0 - 33.0 pg 27.5  27.1    MCHC      32.2 - 35.5 g/dL 33.6  32.3    RDW      35.9 - 50.0 fL 46.6  46.9    Platelet Count      164 - 446 K/uL 242  213    MPV      9.0 - 12.9 fL 12.3  12.2    Neutrophils-Polys      44.00 - 72.00 % 78.50 (H)     Lymphocytes      22.00 - 41.00 % 15.30 (L)     Monocytes      0.00 - 13.40 % 4.40     Eosinophils      0.00 - 6.90 % 0.60     Basophils      0.00 - 1.80 % 0.20     Immature Granulocytes      0.00 - 0.90 % 1.00 (H)     Nucleated RBC      0.00 - 0.20 /100 WBC 0.00     Neutrophils (Absolute)      1.82 - 7.42 K/uL 12.90 (H)     Lymphs (Absolute)      1.00 - 4.80 K/uL 2.52     Monos (Absolute)      0.00 - 0.85 K/uL 0.73     Eos (Absolute)      0.00 - 0.51 K/uL 0.10     Baso (Absolute)      0.00 - 0.12 K/uL 0.03     Immature Granulocytes (abs)       0.00 - 0.11 K/uL 0.16 (H)     NRBC (Absolute)      K/uL 0.00        Legend:  (H) High  (L) Low    A/P: Kelli Doherty is a 35 y.o.  POD#1 s/p rCS + BTL     #Routine Postpartum  - Afebrile, vitals signs stable, pain controlled   - Continue routine post-op/PP care.   - Birth control: s/p TL   - Labs: Rh+/RI, H/H with appropriate drop   - Encourage ambulation and incentive spirometry     #Hypothyroidism  - Continue Synthroid daily    #Mild Intermittent Asthma  - Asymptomatic currently, continue Albuterol PRN    #BMI 54  - Prevena wound vac in place  - Start Lovenox for VTE ppx this evening       Cristel Morris MD MPH

## 2025-06-27 NOTE — ANESTHESIA PREPROCEDURE EVALUATION
Case: [id number] Date/Time: 25    Procedure:  SECTION, REPEAT (Abdomen)    Anesthesia type: Spinal    Pre-op diagnosis: laboring repeat c/s    Location: [facility] OR 01 / SURGERY LABOR AND DELIVERY    Surgeons: Cristel Morris M.D.            Relevant Problems   PULMONARY   (positive) Asthma      ENDO   (positive) Hypothyroid in pregnancy - 75mcg daily       Physical Exam    Airway   Mallampati: II  TM distance: >3 FB  Neck ROM: full       Cardiovascular - normal exam  Rhythm: regular  Rate: normal    (-) murmur     Dental - normal exam           Pulmonary - normal examBreath sounds clear to auscultation     Abdominal    Neurological - normal exam                   Anesthesia Plan    ASA 3- EMERGENT (laboring mother with history of 3 c-sections)   ASA physical status 3 criteria: morbid obesity - BMI greater than or equal to 40ASA physical status emergent criteria: other (comment)    Plan - spinal   Neuraxial block will be primary anesthetic                Postoperative Plan: Postoperative administration of opioids is intended.    Pertinent diagnostic labs and testing reviewed    Informed Consent:    Anesthetic plan and risks discussed with patient.

## 2025-06-27 NOTE — ANESTHESIA POSTPROCEDURE EVALUATION
Patient: Kelli Doherty    Procedure Summary       Date: 25 Room / Location: [facility] OR 01 / SURGERY LABOR AND DELIVERY    Anesthesia Start:  Anesthesia Stop:     Procedure:  SECTION, REPEAT (Abdomen) Diagnosis:       Status post repeat low transverse  section      (repeat c/s with bilateral salpingectomy)    Surgeons: Cristel Morris M.D. Responsible Provider: Ernesto Fay M.D.    Anesthesia Type: spinal ASA Status: 3 - Emergent            Final Anesthesia Type: spinal  Last vitals  BP   Blood Pressure: 119/77    Temp   36.2 °C (97.1 °F)    Pulse   94   Resp   18    SpO2   94 %      Anesthesia Post Evaluation    Patient location during evaluation: PACU  Patient participation: complete - patient participated  Level of consciousness: awake and alert    Airway patency: patent  Anesthetic complications: no  Cardiovascular status: hemodynamically stable  Respiratory status: acceptable  Hydration status: euvolemic    PONV: none    patient able to participate, but full recovery from regional anesthesia has not occurred and is not expected within the stipulated timeframe for the completion of the evaluation      No notable events documented.     Nurse Pain Score: 9 (NPRS)

## 2025-06-27 NOTE — PROGRESS NOTES
2250 Admitted from L and D via [facility] with on going IV Pitocin in progress, Fabian catheter in placed, with sequential stocking bilaterally, Pt oriented to the room, Assessment done fundus firm with light  lochia, abdomen soft non distended, wound vac in placed clean dry and intact, Pt denies pain at this time, Encourage early mobilization, Admission care rendered.

## 2025-06-27 NOTE — LACTATION NOTE
Initial visit  MOB is repeat c/s last mary grace @ 190  Baby girl birth wt:5#13oz/2635gm  MOB history of thyroid disease and takes synthroid. She denies infertility, PCOS, HTN, diabetes. She states she  her other 3 children (ages 7,15, 170 with plentiful milk supply.  MOB states baby is breastfeeding well on left but she is struggling with latch on right. She has offered 5-10 ml of formula after 2 breastfeeds last mary grace and this morning.  Reviewed hunger cues. Encouraged skin to skin and benefits of same for mom and baby discussed. Discussed normal  feeding frequency of first 5 days and stool changes expected by day 5.  Baby placed in safe skin to skin with MOB, and MOB instructed to call for latch assessment, and help on right side,when hunger cues noted.  F/U @ 1330: MOB states baby latched recently to both breasts. She denies any problem latching to either breast. Encouraged to call for latch assessment once per shift to ensure good milk transfer and prevent sore nipples.

## 2025-06-27 NOTE — PROGRESS NOTES
0700- Received report from SOPHIA Watson. Assumed care. 12 hour chart check, MAR and orders reviewed.      0930- Assessment complete. Fundus firm and palpable, lochia scant to light rubra. Pain management and interventions discussed with pt. POC discussed. All questions and concerns discussed. No further concerns.

## 2025-06-27 NOTE — PROGRESS NOTES
1855: Report received from SOPHIA Jj. Care assumed, C/S in progress at this time    2012: Pt transferred from OR2 in stable condition via gurney to PACU 3. Anesthesia and this RN at bs, handoff report received from anesthesia MD.     2115: BP high reading, pt moving arm, readjusted cuff, retake WNL    2245: pt transferred to postpartum via gurney in stable condition by this RN. Infant in arms. Bs report given to SOPHIA Watson, POC discussed, care relinquished.

## 2025-06-28 PROCEDURE — A9270 NON-COVERED ITEM OR SERVICE: HCPCS | Performed by: OBSTETRICS & GYNECOLOGY

## 2025-06-28 PROCEDURE — 700111 HCHG RX REV CODE 636 W/ 250 OVERRIDE (IP): Mod: JZ | Performed by: STUDENT IN AN ORGANIZED HEALTH CARE EDUCATION/TRAINING PROGRAM

## 2025-06-28 PROCEDURE — 700102 HCHG RX REV CODE 250 W/ 637 OVERRIDE(OP): Performed by: OBSTETRICS & GYNECOLOGY

## 2025-06-28 PROCEDURE — A9270 NON-COVERED ITEM OR SERVICE: HCPCS

## 2025-06-28 PROCEDURE — 700102 HCHG RX REV CODE 250 W/ 637 OVERRIDE(OP): Performed by: STUDENT IN AN ORGANIZED HEALTH CARE EDUCATION/TRAINING PROGRAM

## 2025-06-28 PROCEDURE — 770002 HCHG ROOM/CARE - OB PRIVATE (112)

## 2025-06-28 PROCEDURE — 700102 HCHG RX REV CODE 250 W/ 637 OVERRIDE(OP)

## 2025-06-28 PROCEDURE — 0UB70ZZ EXCISION OF BILATERAL FALLOPIAN TUBES, OPEN APPROACH: ICD-10-PCS | Performed by: STUDENT IN AN ORGANIZED HEALTH CARE EDUCATION/TRAINING PROGRAM

## 2025-06-28 PROCEDURE — A9270 NON-COVERED ITEM OR SERVICE: HCPCS | Performed by: STUDENT IN AN ORGANIZED HEALTH CARE EDUCATION/TRAINING PROGRAM

## 2025-06-28 RX ORDER — FERROUS SULFATE 325(65) MG
325 TABLET ORAL
Status: DISCONTINUED | OUTPATIENT
Start: 2025-06-28 | End: 2025-06-29 | Stop reason: HOSPADM

## 2025-06-28 RX ADMIN — METRONIDAZOLE 500 MG: 500 TABLET ORAL at 12:58

## 2025-06-28 RX ADMIN — ACETAMINOPHEN 1000 MG: 500 TABLET ORAL at 00:00

## 2025-06-28 RX ADMIN — FERROUS SULFATE TAB 325 MG (65 MG ELEMENTAL FE) 325 MG: 325 (65 FE) TAB at 08:13

## 2025-06-28 RX ADMIN — ACETAMINOPHEN 1000 MG: 500 TABLET ORAL at 10:57

## 2025-06-28 RX ADMIN — DOCUSATE SODIUM 100 MG: 100 CAPSULE, LIQUID FILLED ORAL at 08:13

## 2025-06-28 RX ADMIN — OXYCODONE HYDROCHLORIDE 10 MG: 10 TABLET ORAL at 10:56

## 2025-06-28 RX ADMIN — METRONIDAZOLE 500 MG: 500 TABLET ORAL at 00:00

## 2025-06-28 RX ADMIN — CEPHALEXIN 500 MG: 500 CAPSULE ORAL at 05:54

## 2025-06-28 RX ADMIN — POLYETHYLENE GLYCOL 3350 1 PACKET: 17 POWDER, FOR SOLUTION ORAL at 05:54

## 2025-06-28 RX ADMIN — CEPHALEXIN 500 MG: 500 CAPSULE ORAL at 00:00

## 2025-06-28 RX ADMIN — CEPHALEXIN 500 MG: 500 CAPSULE ORAL at 12:57

## 2025-06-28 RX ADMIN — ACETAMINOPHEN 1000 MG: 500 TABLET ORAL at 17:54

## 2025-06-28 RX ADMIN — OXYCODONE HYDROCHLORIDE 10 MG: 10 TABLET ORAL at 05:53

## 2025-06-28 RX ADMIN — ACETAMINOPHEN 1000 MG: 500 TABLET ORAL at 23:49

## 2025-06-28 RX ADMIN — OXYCODONE HYDROCHLORIDE 10 MG: 10 TABLET ORAL at 17:54

## 2025-06-28 RX ADMIN — CEPHALEXIN 500 MG: 500 CAPSULE ORAL at 17:54

## 2025-06-28 RX ADMIN — OXYCODONE HYDROCHLORIDE 10 MG: 10 TABLET ORAL at 02:12

## 2025-06-28 RX ADMIN — LEVOTHYROXINE SODIUM 75 MCG: 0.07 TABLET ORAL at 05:54

## 2025-06-28 RX ADMIN — ENOXAPARIN SODIUM 40 MG: 100 INJECTION SUBCUTANEOUS at 17:55

## 2025-06-28 ASSESSMENT — EDINBURGH POSTNATAL DEPRESSION SCALE (EPDS)
I HAVE FELT SCARED OR PANICKY FOR NO GOOD REASON: NO, NOT MUCH
I HAVE LOOKED FORWARD WITH ENJOYMENT TO THINGS: AS MUCH AS I EVER DID
I HAVE BEEN SO UNHAPPY THAT I HAVE BEEN CRYING: ONLY OCCASIONALLY
THE THOUGHT OF HARMING MYSELF HAS OCCURRED TO ME: NEVER
I HAVE BEEN ANXIOUS OR WORRIED FOR NO GOOD REASON: NO, NOT AT ALL
I HAVE BEEN SO UNHAPPY THAT I HAVE HAD DIFFICULTY SLEEPING: NOT VERY OFTEN
I HAVE BLAMED MYSELF UNNECESSARILY WHEN THINGS WENT WRONG: NOT VERY OFTEN
THINGS HAVE BEEN GETTING ON TOP OF ME: NO, MOST OF THE TIME I HAVE COPED QUITE WELL
I HAVE FELT SAD OR MISERABLE: NOT VERY OFTEN
I HAVE BEEN ABLE TO LAUGH AND SEE THE FUNNY SIDE OF THINGS: AS MUCH AS I ALWAYS COULD

## 2025-06-28 ASSESSMENT — PAIN DESCRIPTION - PAIN TYPE
TYPE: SURGICAL PAIN
TYPE: SURGICAL PAIN;ACUTE PAIN
TYPE: SURGICAL PAIN;ACUTE PAIN
TYPE: SURGICAL PAIN
TYPE: SURGICAL PAIN
TYPE: SURGICAL PAIN;ACUTE PAIN
TYPE: SURGICAL PAIN;ACUTE PAIN
TYPE: SURGICAL PAIN

## 2025-06-28 NOTE — CARE PLAN
The patient is Stable - Low risk of patient condition declining or worsening    Shift Goals  Clinical Goals: Pain control, lochia remain WDL, VSS    Progress made toward(s) clinical / shift goals: Patient pain well controlled with rest, repositioning, and medication as needed/scheduled. Ambulating, voiding, and tolerating PO well at this time. No s/sx of infection observed.      Problem: Altered physiologic condition related to postoperative  delivery  Goal: Patient physiologically stable as evidenced by normal lochia, palpable uterine involution and vital signs within normal limits  Outcome: Progressing     Problem: Alteration in comfort related to surgical incision and/or after birth pains  Goal: Patient is able to ambulate, care for self and infant with acceptable pain level  Outcome: Progressing  Goal: Patient verbalizes acceptable pain level  Outcome: Progressing     Problem: Potential knowledge deficit related to lack of understanding of self and  care  Goal: Patient will demonstrate ability to care for self and infant  Outcome: Progressing       Patient is not progressing towards the following goals:

## 2025-06-28 NOTE — CARE PLAN
The patient is Stable - Low risk of patient condition declining or worsening    Shift Goals  Clinical Goals: fundus firm, light lochia  Patient Goals: bond with baby, pain management  Family Goals: update POC    Progress made toward(s) clinical / shift goals:      Patient is not progressing towards the following goals:      Problem: Alteration in comfort related to surgical incision and/or after birth pains  Goal: Patient is able to ambulate, care for self and infant with acceptable pain level  6/27/2025 2335 by Shirley Byrne R.N.  Outcome: Progressing  6/27/2025 2335 by Shirley Byrne, R.N.  Outcome: Progressing     Problem: Potential anxiety related to difficulty adapting to parental role  Goal: Patient will verbalize and demonstrate effective bonding and parenting behavior  6/27/2025 2335 by Shirley Byrne R.N.  Outcome: Progressing  6/27/2025 2335 by Shirley Byrne, R.N.  Outcome: Progressing     Problem: Pain - Standard  Goal: Alleviation of pain or a reduction in pain to the patient’s comfort goal  6/27/2025 2335 by Shirley Byrne, R.N.  Outcome: Progressing  6/27/2025 2335 by Shirley Byrne, R.N.  Outcome: Progressing

## 2025-06-28 NOTE — PROGRESS NOTES
"62-74\" abdominal binder sent to tube station 32    Contact traction for any questions or concerns.   "

## 2025-06-28 NOTE — PROGRESS NOTES
0700- Received report from SOPHIA Watson. Assumed care. 12 hour chart check, MAR and orders reviewed.      0800- Assessment complete. Fundus firm and palpable, lochia scant to light rubra. Pain management and interventions discussed with pt. POC discussed. All questions and concerns discussed. No further concerns.

## 2025-06-28 NOTE — PROGRESS NOTES
Bedside report received from SOPHIA Shafer. Pt in bed resting comfortably at this time. Pt able to get up to restroom and void independently, denies need for pain medication at this time. Pt states that she will call if additional pain medication is needed. Whiteboard updated. POC discussed. Call light within reach. All questions and concerns answered at this time, advised to call for assistance as needed.

## 2025-06-28 NOTE — LACTATION NOTE
This note was copied from a baby's chart.  Multip Mom has BF before, she preferred to do combo feeding, and has already started her new baby on combo feeding. Reviewed feeding volume GL and paced bottle feeding, baby currently is taking about 20mL, q 3 hours ABM. Encouraged Mom to keep baby STS and allow her to BF ad kristin per her feeding cues, minimum of 10 times daily. Try to BF before offering the bottle, supplement no sooner than q 3 hours, per feeding GL. Worked on HE and spoon feeding colostrum back to baby girl. Mom does have a few drops of colostrum from each breast. Latched baby in cross cradle hold on the left side. Mom has a little irregular tip/linear inversions on the left side. Baby is eager to latch and opens mouth wide. Mom has some tenderness with latch. Adjusted for deeper latch, she said was less tender after a few minutes. L-6. Mom was a little awkward holding baby, so positioned in football hold instead, this seemed to be a better more secure hold for Mom/baby. L-7. Taught supply and demand, frequent emptying of both breasts to initiate/maintain her milk supply, while preventing issues with mastitis. She should see her supply increase in about 72 hours from delivery. Wake to breastfeed baby if greater than 2-3 hours during the day, or 3-4 hours during the night since previous BF. Provided written educational materials. Encouraged to call for any additional LC assistance prn.

## 2025-06-29 ENCOUNTER — PHARMACY VISIT (OUTPATIENT)
Dept: PHARMACY | Facility: MEDICAL CENTER | Age: 35
End: 2025-06-29
Payer: COMMERCIAL

## 2025-06-29 VITALS
TEMPERATURE: 97.4 F | WEIGHT: 270 LBS | HEIGHT: 59 IN | DIASTOLIC BLOOD PRESSURE: 80 MMHG | SYSTOLIC BLOOD PRESSURE: 131 MMHG | OXYGEN SATURATION: 93 % | BODY MASS INDEX: 54.43 KG/M2 | HEART RATE: 96 BPM | RESPIRATION RATE: 19 BRPM

## 2025-06-29 PROCEDURE — A9270 NON-COVERED ITEM OR SERVICE: HCPCS

## 2025-06-29 PROCEDURE — 700102 HCHG RX REV CODE 250 W/ 637 OVERRIDE(OP)

## 2025-06-29 PROCEDURE — RXMED WILLOW AMBULATORY MEDICATION CHARGE

## 2025-06-29 PROCEDURE — 700102 HCHG RX REV CODE 250 W/ 637 OVERRIDE(OP): Performed by: STUDENT IN AN ORGANIZED HEALTH CARE EDUCATION/TRAINING PROGRAM

## 2025-06-29 PROCEDURE — A9270 NON-COVERED ITEM OR SERVICE: HCPCS | Performed by: STUDENT IN AN ORGANIZED HEALTH CARE EDUCATION/TRAINING PROGRAM

## 2025-06-29 RX ORDER — ENOXAPARIN SODIUM 100 MG/ML
60 INJECTION SUBCUTANEOUS DAILY
Qty: 21 EACH | Refills: 0 | Status: ACTIVE | OUTPATIENT
Start: 2025-06-29 | End: 2025-07-20

## 2025-06-29 RX ORDER — PSEUDOEPHEDRINE HCL 30 MG
100 TABLET ORAL 2 TIMES DAILY PRN
Qty: 60 CAPSULE | Refills: 0 | Status: SHIPPED | OUTPATIENT
Start: 2025-06-29

## 2025-06-29 RX ORDER — POLYETHYLENE GLYCOL 3350 17 G/17G
17 POWDER, FOR SOLUTION ORAL DAILY
Qty: 30 PACKET | Refills: 0 | Status: SHIPPED | OUTPATIENT
Start: 2025-06-30

## 2025-06-29 RX ORDER — ASCORBIC ACID 500 MG
500 TABLET ORAL
Qty: 90 TABLET | Refills: 0 | Status: SHIPPED | OUTPATIENT
Start: 2025-06-29

## 2025-06-29 RX ORDER — FERROUS SULFATE 325(65) MG
325 TABLET ORAL
Qty: 90 TABLET | Refills: 0 | Status: SHIPPED | OUTPATIENT
Start: 2025-06-29

## 2025-06-29 RX ORDER — OXYCODONE HYDROCHLORIDE 5 MG/1
5 TABLET ORAL EVERY 6 HOURS PRN
Qty: 16 TABLET | Refills: 0 | Status: SHIPPED | OUTPATIENT
Start: 2025-06-29 | End: 2025-07-03

## 2025-06-29 RX ORDER — ACETAMINOPHEN 500 MG
500-1000 TABLET ORAL EVERY 6 HOURS PRN
Qty: 120 TABLET | Refills: 2 | Status: SHIPPED | OUTPATIENT
Start: 2025-06-29

## 2025-06-29 RX ADMIN — FERROUS SULFATE TAB 325 MG (65 MG ELEMENTAL FE) 325 MG: 325 (65 FE) TAB at 08:45

## 2025-06-29 RX ADMIN — DOCUSATE SODIUM 100 MG: 100 CAPSULE, LIQUID FILLED ORAL at 06:16

## 2025-06-29 RX ADMIN — ACETAMINOPHEN 1000 MG: 500 TABLET ORAL at 06:16

## 2025-06-29 RX ADMIN — OXYCODONE HYDROCHLORIDE 10 MG: 10 TABLET ORAL at 05:15

## 2025-06-29 RX ADMIN — OXYCODONE HYDROCHLORIDE 10 MG: 10 TABLET ORAL at 10:01

## 2025-06-29 RX ADMIN — LEVOTHYROXINE SODIUM 75 MCG: 0.07 TABLET ORAL at 06:16

## 2025-06-29 RX ADMIN — ACETAMINOPHEN 1000 MG: 500 TABLET ORAL at 12:17

## 2025-06-29 ASSESSMENT — PAIN DESCRIPTION - PAIN TYPE
TYPE: SURGICAL PAIN
TYPE: ACUTE PAIN
TYPE: ACUTE PAIN
TYPE: SURGICAL PAIN
TYPE: ACUTE PAIN

## 2025-06-29 NOTE — DISCHARGE INSTRUCTIONS

## 2025-06-29 NOTE — PROGRESS NOTES
DC instructions reviewed w/ pt and fob, verbalized understanding, Meds to bed delivered. DC home w/ female infant in a car seat in stable condition. Bands matched w/ mob. Cuddles removed.

## 2025-06-29 NOTE — LACTATION NOTE
This note was copied from a baby's chart.  Follow-Up Consult:     History of BF: Multip. Combination fed her prior babies, with breastfeeding for several months without difficulty.      Report of Current Breastfeeding Status:   Combination feeding. Reports breastfeeding is going well. Recorded 10 breastfeeds in the last 24h, lasting 15 mins each. Nipples mildly sore, consistent with experience with prior babies.     She does not have WIC, but has phone number for [facility] and plans to call.     Breastfeeding Assistance:      [facility] Breastfeeding Resources handout provided and outpatient lactation care and support [employer] [place] options reviewed.     Anticipatory guidance provided regarding engorgement care: frequent breastfeeding, gentle effleurage towards axilla, cool compresses, hand express to comfort and to soften nipple area for latch, NSAIDs as prescribed for postpartum pain relief. Discussed mastitis warning signs.    Discussed sore nipple care: Express colostrum to nipple and spread over nipple, allow to air dry. Follow with lanolin cream for dryness/crustiness.     Encouraged to watch latch and sore nipples videos on [employer] feng.     Encouraged MOB to reach out via bedside RN if she would like latch assist/assessment later during her stay.    PLAN:    Skin to skin when either parent awake and alert.    Offer breast whenever hunger cues noted.    If baby sleeps more than 3 hours, wake baby and offer breast.    If baby not waking for feeding at least every 3 hours, hand express and spoon/cup feed back EBM to baby.    Watch latch and sore nipples videos on [employer] feng.

## 2025-06-29 NOTE — DISCHARGE SUMMARY
[facility]  Obstetrics Discharge Summary    Date of Admission: 2025  Date of Discharge: 25    Admitting diagnosis:    1. Pregnancy at 39w2d  2. Asthma  3. Hypothyroidism  4. History of 3 prior     Discharge Diagnosis:   1. Status post  for repeat.  2. Acute blood loss anemia  3. Status post bilateral salpingectomy  4. Same as above    Hospital Course:   Pt is 35 y.o. now  who presented on 2025 for repeat .   This was performed on 25 without complication. Patient also had a bilateral salpingectomy during procedure.  Patient was started on Lovenox postpartum due to BMI of 54. She was continued on her home Levothyroxine.    Postpartum course was unremarkable and patient has met all postpartum milestones.  Patient had early ambulation, well managed pain, tolerance of diet, spontaneous voiding, and appropriate feeding of infant.   She has remained afebrile and blood pressure has been well controlled.   All maternal questions and concerns addressed.    Single female infant was delivered via  on 25 at 1901 with APGARs 8 and 9 at 1 and 5 minutes respectively.    ml    PHYSICAL EXAM:  Temp:  [36.4 °C (97.6 °F)-36.7 °C (98 °F)] 36.4 °C (97.6 °F)  Pulse:  [] 93  Resp:  [18-20] 18  BP: (125-138)/(74-84) 138/74  SpO2:  [91 %-94 %] 94 %    GEN: well appearing, no apparent distress  CV: +S1S2, RRR, trace BLE edema  RESP: CTAB, breathing comfortably on RA  ABD: soft, non-tender, non-distended, +BS  Fundus: firm below level of umbilicus  Incision: dressing clean, dry, intact  Perineum: Deferred  Extremities: symmetric, calves nontender    HISTORY:  Problem List[1]   Past Medical History[2]  OB History    Para Term  AB Living   8 4 3 1 4 4   SAB IAB Ectopic Molar Multiple Live Births   2 2   0 4      # Outcome Date GA Lbr Derek/2nd Weight Sex Type Anes PTL Lv   8 Term 25 39w2d  2.635 kg (5 lb 13 oz) F CS-LTranv Spinal N ROSMERY    7 SAB  6w0d             Birth Comments: Pt states passed on its own   6 SAB  7w0d             Birth Comments: Pt states passed on its own   5  17 35w0d  2.722 kg (6 lb) M CS-LTranv Spinal Y ROSMERY      Birth Comments: Repeat C/S elevated blood pressures per pt 210/120   4 IAB  5w0d             Birth Comments: Pt states no complications.   3 Term 05/26/10 38w0d  3.232 kg (7 lb 2 oz) M CS-LTranv Spinal N ROSMERY      Birth Comments: Repeat C/S   2 Term 08 39w0d  2.948 kg (6 lb 8 oz) M CS-LTranv Spinal N ROSMERY      Birth Comments: primary C/S IUGR failed induction secondary to fetal intolerance Occiput transverse   1 IAB  5w0d             Birth Comments: Pt states no complications     Past Surgical History[3]  Allergies[4]   Current Facility-Administered Medications   Medication Dose    ferrous sulfate tablet 325 mg  325 mg    enoxaparin (Lovenox) inj 40 mg  40 mg    lactated ringers infusion      NS infusion      oxytocin (Pitocin) infusion (for post delivery)  125 mL/hr    oxytocin (Pitocin) injection 10 Units  10 Units    miSOPROStol (Cytotec) tablet 800 mcg  800 mcg    methylergonovine (Methergine) injection 0.2 mg  0.2 mg    carboPROST (Hemabate) injection 250 mcg  250 mcg    albuterol inhaler 2 Puff  2 Puff    levothyroxine (Synthroid) tablet 75 mcg  75 mcg    polyethylene glycol/lytes (Miralax) Packet 1 Packet  1 Packet    lactated ringers infusion  2,000 mL    acetaminophen (Tylenol) tablet 1,000 mg  1,000 mg    Followed by    [START ON 2025] acetaminophen (Tylenol) tablet 1,000 mg  1,000 mg    oxyCODONE immediate-release (Roxicodone) tablet 5 mg  5 mg    oxyCODONE immediate release (Roxicodone) tablet 10 mg  10 mg    ondansetron (Zofran) syringe/vial injection 4 mg  4 mg    Or    ondansetron (Zofran ODT) dispertab 4 mg  4 mg    diphenhydrAMINE (Benadryl) tablet/capsule 25 mg  25 mg    Or    diphenhydrAMINE (Benadryl) injection 25 mg  25 mg    docusate sodium (Colace) capsule  100 mg  100 mg     Recent Labs     06/26/25  1430 06/27/25  0443   WBC 16.4* 20.3*   RBC 4.99 4.14*   HEMOGLOBIN 13.7 11.2*   HEMATOCRIT 40.8 34.7*   MCV 81.8 83.8   MCH 27.5 27.1   MCHC 33.6 32.3   RDW 46.6 46.9   PLATELETCT 242 213   MPV 12.3 12.2       Discharge Meds:   Current Outpatient Medications   Medication Sig Dispense Refill    acetaminophen (TYLENOL) 500 MG Tab Take 1-2 Tablets by mouth every 6 hours as needed for Mild Pain or Moderate Pain. 120 Tablet 2    docusate sodium 100 MG Cap Take 1 capsule by mouth 2 times a day as needed for Constipation. 60 Capsule 0    enoxaparin (LOVENOX) 40 MG/0.4ML Solution Prefilled Syringe inj Inject 40 mg under the skin every day at 6 PM for 14 days. 14 Each 0    ferrous sulfate 325 (65 Fe) MG tablet Take 1 Tablet by mouth every 48 hours. 90 Tablet 0    oxyCODONE immediate-release (ROXICODONE) 5 MG Tab Take 1 Tablet by mouth every 6 hours as needed for Severe Pain for up to 4 days. 16 Tablet 0    [START ON 6/30/2025] polyethylene glycol/lytes (MIRALAX) 17 g Pack Mix and drink 1 Packet by mouth every day. 30 Packet 0    ascorbic acid (VITAMIN C) 500 MG tablet Take 1 Tablet by mouth every 48 hours. 90 Tablet 0           Activity/ Discharge Instructions:  Discharge to home  Exercise and Activities as tolerated  Pelvic Rest x 6 weeks  No heavy lifting x4 weeks  Call or come to ED for: heavy vaginal bleeding, fever >100.4, severe abdominal pain, severe headache, chest pain, shortness of breath, significant nausea or vomiting, incisional drainage, or other concerns.  Contraception: s/p BTL    Obesity:  Pt to continue on Lovenox 40mg daily for 2 weeks given BMI 54.    Hypothyroidism:   Continue Levothyroxine 75mcg daily.    Diet:  As tolerated. Additional 400 kcal per day to maintain milk supply. Drink plenty of fluids daily.  Continue prenatal vitamins for six months or as long as breastfeeding.  Continue iron and vitamin C every other day for six months or until anemia  improves.     Follow up:     [facility] in one week for incision check for  delivery. Patient should report to clinic on 7/3 for removal of Prevena wound vac.      Fide Cleveland DO  PGY-1 Family Medicine Resident  [facility]       I reviewed patient's clinical course and discussed the management with the resident. I reviewed the resident's note and agree with the documented findings and plan of care.    Additional attending comments:    Patient is PPD#3 s/p rCS + BTL.  Her  section was uncomplicated; she had prevena wound vac placed for incisional ppx given elevated BMI. She has continued on daily dose of Synthroid and albuterol as needed. She is hemodynamically stable, with reassuring H/H, meeting appropriate postpartum milestones. Requests discharge today. RTC in 1 week for incision check and removal of prevena wound vac.    Cristel Morris MD MPH         [1]   Patient Active Problem List  Diagnosis    Anxiety disorder    Asthma    Advanced maternal age in multigravida, second trimester    History of C/S x 3 - repeat and BTL/BS (consent signed 25)    History of PTD at 35wk for PreE    History of pre-eclampsia x 1 - ASA started NOB visit    Hypothyroid in pregnancy - 75mcg daily    Fetal VSD (ventricular septal defect) on anatomy US - needs [facility] echo    Endometriosis - desires hysterectomy after delivery     deliv due to previous difficult delivras, curr hospitaliz   [2]   Past Medical History:  Diagnosis Date    Allergy     Anemia     Anesthesia 2025    PONV, pt with history of motion sickness    Asthma 2025    inhaler prn    Back pain     Breath shortness 2025    with pregnancy    Bronchitis     Chickenpox     Cough     Depression 2025    with anxiety, not medicated at present    Head ache     Hypertension 2017    with preeclampsia with prior pregnancies    Influenza     Migraine     Pain 2025    contractions    PONV  "(postoperative nausea and vomiting) 2025    pt with history of motion sickness    Pregnant 2025    at present    Thyroid disease 2025    medicated    Wheezing    [3]   Past Surgical History:  Procedure Laterality Date    REPEAT C SECTION  2025    Procedure:  SECTION, REPEAT;  Surgeon: Cristel Morris M.D.;  Location: SURGERY LABOR AND DELIVERY;  Service: Obstetrics    OTHER ORTHOPEDIC SURGERY Right 2025    Ankle times 2    GYN SURGERY  2025    endometrial mass removed    REPEAT C SECTION  2025    times 1    WA COLONOSCOPY-FLEXIBLE  2020    Procedure: COLONOSCOPY;  Surgeon: Marck Mcconnell M.D.;  Location: SURGERY [facility];  Service: Gastroenterology    OTHER ABDOMINAL SURGERY      galbladder removal    GYN SURGERY           PRIMARY C SECTION     [4]   Allergies  Allergen Reactions    Iodine Hives and Rash     Blisters    Aleve [Naproxen Sodium] Rash     \"rash\"    Betadine [Povidone Iodine] Rash     \"rash, hives\"    Latex Rash     Rash    Sulfamethoxazole W-Trimethoprim Vomiting     \"vomiting\"     "

## 2025-06-29 NOTE — PROGRESS NOTES
0977 Report received from SOPHIA RATLIFF  2020 Pt assessment completed. No abnormal findings noted. All pt needs and questions addressed at this time.

## 2025-06-29 NOTE — CARE PLAN
Problem: Alteration in comfort related to surgical incision and/or after birth pains  Goal: Patient is able to ambulate, care for self and infant with acceptable pain level  Outcome: Progressing   The patient is Stable - Low risk of patient condition declining or worsening    Shift Goals  Clinical Goals: firm fundus, light lochia  Patient Goals: rest  Family Goals: bonding    Progress made toward(s) clinical / shift goals:  Pt is ambulating independently. Pt indicates pain is manageable and tolerable with administration of pain medications. Pt is able to care for self and infant appropriately.

## 2025-07-08 ENCOUNTER — GYNECOLOGY VISIT (OUTPATIENT)
Dept: OBGYN | Facility: CLINIC | Age: 35
End: 2025-07-08
Payer: MEDICAID

## 2025-07-08 VITALS — SYSTOLIC BLOOD PRESSURE: 124 MMHG | BODY MASS INDEX: 51.71 KG/M2 | WEIGHT: 256 LBS | DIASTOLIC BLOOD PRESSURE: 80 MMHG

## 2025-07-08 DIAGNOSIS — Z09 POSTOP CHECK: Primary | ICD-10-CM

## 2025-07-08 PROCEDURE — 99024 POSTOP FOLLOW-UP VISIT: CPT | Performed by: OBSTETRICS & GYNECOLOGY

## 2025-07-08 PROCEDURE — 3074F SYST BP LT 130 MM HG: CPT | Performed by: OBSTETRICS & GYNECOLOGY

## 2025-07-08 PROCEDURE — 3079F DIAST BP 80-89 MM HG: CPT | Performed by: OBSTETRICS & GYNECOLOGY

## 2025-07-08 ASSESSMENT — FIBROSIS 4 INDEX: FIB4 SCORE: 0.56

## 2025-07-08 NOTE — PROGRESS NOTES
Subjective; Kelli Doherty is 35 y.o. Female who underwent repeat , BS on 2025.  Patient is currently doing well without complaints.  Patient is nursing        Objective;  Vitals-vital signs stable    Abd-nondistended, incision healing well, positive bowel sounds, soft, nontender, fundus firm    Impression;  Status post repeat  section, bilateral salpingectomy stable    PLAN;  Routine care  Ambulate  Tylenol and Motrin for pain  Follow-up in 4 weeks for full postpartum examination

## 2025-08-04 ENCOUNTER — POST PARTUM (OUTPATIENT)
Dept: OBGYN | Facility: CLINIC | Age: 35
End: 2025-08-04
Payer: MEDICAID

## 2025-08-04 VITALS — SYSTOLIC BLOOD PRESSURE: 118 MMHG | BODY MASS INDEX: 51.3 KG/M2 | DIASTOLIC BLOOD PRESSURE: 64 MMHG | WEIGHT: 254 LBS

## 2025-08-04 PROCEDURE — 0503F POSTPARTUM CARE VISIT: CPT | Performed by: OBSTETRICS & GYNECOLOGY

## 2025-08-04 PROCEDURE — 3074F SYST BP LT 130 MM HG: CPT | Performed by: OBSTETRICS & GYNECOLOGY

## 2025-08-04 PROCEDURE — 3078F DIAST BP <80 MM HG: CPT | Performed by: OBSTETRICS & GYNECOLOGY

## 2025-08-04 ASSESSMENT — EDINBURGH POSTNATAL DEPRESSION SCALE (EPDS)
I HAVE BEEN SO UNHAPPY THAT I HAVE BEEN CRYING: ONLY OCCASIONALLY
I HAVE FELT SAD OR MISERABLE: NOT VERY OFTEN
I HAVE BEEN ABLE TO LAUGH AND SEE THE FUNNY SIDE OF THINGS: AS MUCH AS I ALWAYS COULD
I HAVE BLAMED MYSELF UNNECESSARILY WHEN THINGS WENT WRONG: YES, SOME OF THE TIME
I HAVE BEEN ANXIOUS OR WORRIED FOR NO GOOD REASON: YES, SOMETIMES
I HAVE LOOKED FORWARD WITH ENJOYMENT TO THINGS: AS MUCH AS I EVER DID
THE THOUGHT OF HARMING MYSELF HAS OCCURRED TO ME: NEVER
THINGS HAVE BEEN GETTING ON TOP OF ME: NO, MOST OF THE TIME I HAVE COPED QUITE WELL
I HAVE FELT SCARED OR PANICKY FOR NO GOOD REASON: NO, NOT AT ALL
I HAVE BEEN SO UNHAPPY THAT I HAVE HAD DIFFICULTY SLEEPING: NOT VERY OFTEN
TOTAL SCORE: 8

## 2025-08-04 ASSESSMENT — FIBROSIS 4 INDEX: FIB4 SCORE: 0.56

## (undated) DEVICE — CANISTER SUCTION 3000ML MECHANICAL FILTER AUTO SHUTOFF MEDI-VAC NONSTERILE LF DISP (40EA/CA)

## (undated) DEVICE — ELECTRODE 850 FOAM ADHESIVE - HYDROGEL RADIOTRNSPRNT (50/PK)

## (undated) DEVICE — KIT CUSTOM PROCEDURE SINGLE FOR ENDO  (15/CA)

## (undated) DEVICE — FORCEP RADIAL JAW 4 STANDARD CAPACITY W/NEEDLE 240CM (40EA/BX)

## (undated) DEVICE — CATHETER IV SAFETY 20 GA X 1-1/4 (50/BX)

## (undated) DEVICE — SPONGE SURGICAL PVP IODINE L8 IN (20EA/CA)

## (undated) DEVICE — SYRINGE DISP. 60 CC LL - (30/BX, 12BX/CA)**WHEN THESE ARE GONE ORDER #500206**

## (undated) DEVICE — CATHETER IV NON-SAFETY 18 GA X 1 1/4 (50/BX 4BX/CA)

## (undated) DEVICE — CANISTER SUCTION RIGID RED 1500CC (40EA/CA)

## (undated) DEVICE — TUBING CLEARLINK DUO-VENT - C-FLO (48EA/CA)

## (undated) DEVICE — NEPTUNE 4 PORT MANIFOLD - (20/PK)

## (undated) DEVICE — SYRINGE SAFETY 3 ML 18 GA X 1 1/2 BLUNT LL (100/BX 8BX/CA)

## (undated) DEVICE — CHLORAPREP 26 ML APPLICATOR - ORANGE TINT(25/CA)

## (undated) DEVICE — SYSTEM PREVENA INCISION MNGM - (1/EA)

## (undated) DEVICE — KIT  I.V. START (100EA/CA)

## (undated) DEVICE — RETRACTOR O C SECTION LRY - (5/BX)

## (undated) DEVICE — SODIUM CHL IRRIGATION 0.9% 1000ML (12EA/CA)

## (undated) DEVICE — SLEEVE VASO DVT COMPRESSION CALF MED - (10PR/CA)

## (undated) DEVICE — SENSOR SPO2 ADULT LNCS ADTX (20/BX) ORDER ITEM #19593

## (undated) DEVICE — SLEEVE VASO DVT COMPRESSION CALF LRG - (10PR/CA)

## (undated) DEVICE — PACK ROOM TURNOVER L&D (12/CA)

## (undated) DEVICE — GLOVE BIOGEL SZ 6.5 SURGICAL PF LTX (50PR/BX 4BX/CA)

## (undated) DEVICE — SET EXTENSION WITH 2 PORTS (48EA/CA) ***PART #2C8610 IS A SUBSTITUTE*****

## (undated) DEVICE — Device

## (undated) DEVICE — CANNULA O2 COMFORT SOFT EAR ADULT 7 FT TUBING (50/CA)

## (undated) DEVICE — KIT SKIN NOSE AND MOUTH PRE-OP (20/CA)

## (undated) DEVICE — SYRINGE SAFETY 10 ML 18 GA X 1 1/2 BLUNT LL (100/BX 4BX/CA)

## (undated) DEVICE — SUTURE 4-0 27IN VCRL PLUS ANTI (36PK/BX)

## (undated) DEVICE — DRESSING POST OP BORDER 4 X 10 (5EA/BX)

## (undated) DEVICE — SYRINGE SAFETY 5 ML 18 GA X 1-1/2 BLUNT LL (100/BX 4BX/CA)

## (undated) DEVICE — BAG SPONGE COUNT 10.25 X 32 - BLUE (250/CA)

## (undated) DEVICE — LACTATED RINGERS INJ 1000 ML - (14EA/CA 60CA/PF)

## (undated) DEVICE — GLOVE, LITE (PAIR)

## (undated) DEVICE — PAD LAP STERILE 18 X 18 - (5/PK 40PK/CA)

## (undated) DEVICE — PAD PREP 24 X 48 CUFFED - (100/CA)

## (undated) DEVICE — WATER IRRIGATION STERILE 1000ML (12EA/CA)

## (undated) DEVICE — RETRACTOR O C SECTION X-LRY - (5/BX)

## (undated) DEVICE — SUTURE 0 VICRYL PLUS CT-1 - 36 INCH (36/BX)

## (undated) DEVICE — SUTURE 3-0 VICRYL PLUS CT-1 - 36 INCH (36/BX)

## (undated) DEVICE — BLANKET STERILE CHICKIE FOR L&D (100/CA)

## (undated) DEVICE — SOLUTION PLASMA-LYTE PH 7.4 INJ 1000ML (14EA/CA)

## (undated) DEVICE — HEAD HOLDER JUNIOR/ADULT

## (undated) DEVICE — SLEEVE VASO DVT COMPRESSION CALF BARIATRIC (10PR/BX 20EA/BX)

## (undated) DEVICE — TRAY SPINAL ANESTHESIA NON-SAFETY (20/CA)

## (undated) DEVICE — PACK C-SECTION (2EA/CA)

## (undated) DEVICE — SPONGE GAUZE NON-STERILE 4X4 - (2000/CA 10PK/CA)

## (undated) DEVICE — BLANKET UNDERBODY ADULT - (10/CA)

## (undated) DEVICE — TUBE CONNECTING SUCTION - CLEAR PLASTIC STERILE 72 IN (50EA/CA)

## (undated) DEVICE — PLUMEPEN ULTRA 3/8 IN X 10 FT HOSE (20EA/CA)

## (undated) DEVICE — GOWN SURGEONS LARGE - (32/CA)

## (undated) DEVICE — GLOVE BIOGEL INDICATOR SZ 7SURGICAL PF LTX - (50/BX 4BX/CA)